# Patient Record
Sex: MALE | Race: BLACK OR AFRICAN AMERICAN | HISPANIC OR LATINO | Employment: FULL TIME | URBAN - METROPOLITAN AREA
[De-identification: names, ages, dates, MRNs, and addresses within clinical notes are randomized per-mention and may not be internally consistent; named-entity substitution may affect disease eponyms.]

---

## 2017-01-19 ENCOUNTER — TRANSCRIBE ORDERS (OUTPATIENT)
Dept: LAB | Facility: HOSPITAL | Age: 27
End: 2017-01-19

## 2017-01-19 ENCOUNTER — APPOINTMENT (OUTPATIENT)
Dept: LAB | Facility: HOSPITAL | Age: 27
End: 2017-01-19

## 2017-01-19 DIAGNOSIS — F32.1 MAJOR DEPRESSIVE DISORDER, SINGLE EPISODE, MODERATE (HCC): ICD-10-CM

## 2017-01-19 DIAGNOSIS — Z79.899 ENCOUNTER FOR LONG-TERM (CURRENT) USE OF OTHER MEDICATIONS: Primary | ICD-10-CM

## 2017-01-19 DIAGNOSIS — Z79.899 ENCOUNTER FOR LONG-TERM (CURRENT) USE OF OTHER MEDICATIONS: ICD-10-CM

## 2017-01-19 LAB
ALBUMIN SERPL BCP-MCNC: 4.3 G/DL (ref 3.5–5)
ALP SERPL-CCNC: 130 U/L (ref 46–116)
ALT SERPL W P-5'-P-CCNC: 53 U/L (ref 12–78)
ANION GAP SERPL CALCULATED.3IONS-SCNC: 7 MMOL/L (ref 4–13)
AST SERPL W P-5'-P-CCNC: 26 U/L (ref 5–45)
BASOPHILS # BLD AUTO: 0.02 THOUSANDS/ΜL (ref 0–0.1)
BASOPHILS NFR BLD AUTO: 0 % (ref 0–1)
BILIRUB SERPL-MCNC: 0.36 MG/DL (ref 0.2–1)
BUN SERPL-MCNC: 13 MG/DL (ref 5–25)
CALCIUM SERPL-MCNC: 9.1 MG/DL (ref 8.3–10.1)
CHLORIDE SERPL-SCNC: 103 MMOL/L (ref 100–108)
CHOLEST SERPL-MCNC: 178 MG/DL (ref 50–200)
CO2 SERPL-SCNC: 29 MMOL/L (ref 21–32)
CREAT SERPL-MCNC: 0.82 MG/DL (ref 0.6–1.3)
EOSINOPHIL # BLD AUTO: 0.21 THOUSAND/ΜL (ref 0–0.61)
EOSINOPHIL NFR BLD AUTO: 3 % (ref 0–6)
ERYTHROCYTE [DISTWIDTH] IN BLOOD BY AUTOMATED COUNT: 12.9 % (ref 11.6–15.1)
GFR SERPL CREATININE-BSD FRML MDRD: >60 ML/MIN/1.73SQ M
GLUCOSE SERPL-MCNC: 96 MG/DL (ref 65–140)
HCT VFR BLD AUTO: 49.5 % (ref 36.5–49.3)
HDLC SERPL-MCNC: 44 MG/DL (ref 40–60)
HGB BLD-MCNC: 17.1 G/DL (ref 12–17)
LDLC SERPL CALC-MCNC: 112 MG/DL (ref 0–100)
LYMPHOCYTES # BLD AUTO: 2.01 THOUSANDS/ΜL (ref 0.6–4.47)
LYMPHOCYTES NFR BLD AUTO: 29 % (ref 14–44)
MCH RBC QN AUTO: 30.5 PG (ref 26.8–34.3)
MCHC RBC AUTO-ENTMCNC: 34.5 G/DL (ref 31.4–37.4)
MCV RBC AUTO: 88 FL (ref 82–98)
MONOCYTES # BLD AUTO: 0.4 THOUSAND/ΜL (ref 0.17–1.22)
MONOCYTES NFR BLD AUTO: 6 % (ref 4–12)
NEUTROPHILS # BLD AUTO: 4.35 THOUSANDS/ΜL (ref 1.85–7.62)
NEUTS SEG NFR BLD AUTO: 62 % (ref 43–75)
NRBC BLD AUTO-RTO: 0 /100 WBCS
PLATELET # BLD AUTO: 220 THOUSANDS/UL (ref 149–390)
PMV BLD AUTO: 10.6 FL (ref 8.9–12.7)
POTASSIUM SERPL-SCNC: 4.3 MMOL/L (ref 3.5–5.3)
PROT SERPL-MCNC: 8.1 G/DL (ref 6.4–8.2)
RBC # BLD AUTO: 5.61 MILLION/UL (ref 3.88–5.62)
SODIUM SERPL-SCNC: 139 MMOL/L (ref 136–145)
TRIGL SERPL-MCNC: 108 MG/DL
TSH SERPL DL<=0.05 MIU/L-ACNC: 2.48 UIU/ML (ref 0.36–3.74)
WBC # BLD AUTO: 7.01 THOUSAND/UL (ref 4.31–10.16)

## 2017-01-19 PROCEDURE — 84443 ASSAY THYROID STIM HORMONE: CPT

## 2017-01-19 PROCEDURE — 36415 COLL VENOUS BLD VENIPUNCTURE: CPT

## 2017-01-19 PROCEDURE — 85025 COMPLETE CBC W/AUTO DIFF WBC: CPT

## 2017-01-19 PROCEDURE — 80061 LIPID PANEL: CPT

## 2017-01-19 PROCEDURE — 80053 COMPREHEN METABOLIC PANEL: CPT

## 2019-10-17 ENCOUNTER — APPOINTMENT (EMERGENCY)
Dept: RADIOLOGY | Facility: HOSPITAL | Age: 29
End: 2019-10-17
Payer: OTHER MISCELLANEOUS

## 2019-10-17 ENCOUNTER — HOSPITAL ENCOUNTER (EMERGENCY)
Facility: HOSPITAL | Age: 29
Discharge: HOME | End: 2019-10-17
Attending: EMERGENCY MEDICINE | Admitting: EMERGENCY MEDICINE
Payer: OTHER MISCELLANEOUS

## 2019-10-17 VITALS
HEART RATE: 72 BPM | RESPIRATION RATE: 18 BRPM | DIASTOLIC BLOOD PRESSURE: 81 MMHG | OXYGEN SATURATION: 98 % | TEMPERATURE: 98.1 F | SYSTOLIC BLOOD PRESSURE: 139 MMHG

## 2019-10-17 VITALS
BODY MASS INDEX: 28.34 KG/M2 | OXYGEN SATURATION: 97 % | TEMPERATURE: 98.3 F | HEART RATE: 73 BPM | WEIGHT: 160 LBS | DIASTOLIC BLOOD PRESSURE: 73 MMHG | RESPIRATION RATE: 18 BRPM | SYSTOLIC BLOOD PRESSURE: 134 MMHG

## 2019-10-17 DIAGNOSIS — S62.609A FINGER FRACTURE: Primary | ICD-10-CM

## 2019-10-17 DIAGNOSIS — M79.5 FOREIGN BODY (FB) IN SOFT TISSUE: Primary | ICD-10-CM

## 2019-10-17 PROCEDURE — 73140 X-RAY EXAM OF FINGER(S): CPT

## 2019-10-17 PROCEDURE — 99283 EMERGENCY DEPT VISIT LOW MDM: CPT | Performed by: PHYSICIAN ASSISTANT

## 2019-10-17 PROCEDURE — 10120 INC&RMVL FB SUBQ TISS SMPL: CPT | Performed by: PHYSICIAN ASSISTANT

## 2019-10-17 PROCEDURE — 29130 APPL FINGER SPLINT STATIC: CPT | Performed by: PHYSICIAN ASSISTANT

## 2019-10-17 PROCEDURE — 99283 EMERGENCY DEPT VISIT LOW MDM: CPT

## 2019-10-17 RX ORDER — AMOXICILLIN AND CLAVULANATE POTASSIUM 875; 125 MG/1; MG/1
1 TABLET, FILM COATED ORAL EVERY 12 HOURS
Qty: 14 TABLET | Refills: 0 | Status: SHIPPED | OUTPATIENT
Start: 2019-10-17 | End: 2019-10-25

## 2019-10-17 RX ORDER — LIDOCAINE HYDROCHLORIDE 10 MG/ML
5 INJECTION, SOLUTION EPIDURAL; INFILTRATION; INTRACAUDAL; PERINEURAL ONCE
Status: COMPLETED | OUTPATIENT
Start: 2019-10-17 | End: 2019-10-17

## 2019-10-17 RX ORDER — BUPIVACAINE HYDROCHLORIDE 2.5 MG/ML
5 INJECTION, SOLUTION EPIDURAL; INFILTRATION; INTRACAUDAL ONCE
Status: COMPLETED | OUTPATIENT
Start: 2019-10-17 | End: 2019-10-17

## 2019-10-17 RX ORDER — AMOXICILLIN AND CLAVULANATE POTASSIUM 875; 125 MG/1; MG/1
1 TABLET, FILM COATED ORAL ONCE
Status: COMPLETED | OUTPATIENT
Start: 2019-10-17 | End: 2019-10-17

## 2019-10-17 RX ADMIN — BUPIVACAINE HYDROCHLORIDE 5 ML: 2.5 INJECTION, SOLUTION EPIDURAL; INFILTRATION; INTRACAUDAL; PERINEURAL at 12:30

## 2019-10-17 RX ADMIN — AMOXICILLIN AND CLAVULANATE POTASSIUM 1 TABLET: 875; 125 TABLET, FILM COATED ORAL at 13:00

## 2019-10-17 RX ADMIN — LIDOCAINE HYDROCHLORIDE 5 ML: 10 INJECTION, SOLUTION EPIDURAL; INFILTRATION; INTRACAUDAL; PERINEURAL at 12:30

## 2019-10-17 NOTE — ED PROVIDER NOTES
History  Chief Complaint   Patient presents with    Finger Injury     pt here for re eval of finger injury from earlier today     Patient was called for notification an associated fracture with his nail foreign body  He presented to the emergency room when he was thrown to be notified regarding the fracture  History provided by:  Patient  Hand Pain   Location:  Left little finger  Severity:  Mild  Onset quality:  Sudden  Progression:  Unchanged      Prior to Admission Medications   Prescriptions Last Dose Informant Patient Reported? Taking?   amoxicillin-clavulanate (AUGMENTIN) 875-125 mg per tablet   No No   Sig: Take 1 tablet by mouth every 12 (twelve) hours for 7 days   ibuprofen (MOTRIN) 600 mg tablet   No No   Sig: Take 1 tablet (600 mg total) by mouth every 6 (six) hours as needed for mild pain or fever for up to 20 doses  Facility-Administered Medications: None       No past medical history on file  No past surgical history on file  No family history on file  I have reviewed and agree with the history as documented  Social History     Tobacco Use    Smoking status: Never Smoker    Smokeless tobacco: Never Used   Substance Use Topics    Alcohol use: No    Drug use: No        Review of Systems   Skin: Positive for wound  All other systems reviewed and are negative  Physical Exam  Physical Exam   Constitutional: He is oriented to person, place, and time  He appears well-developed and well-nourished  No distress  HENT:   Head: Normocephalic and atraumatic  Eyes: Conjunctivae are normal  Right eye exhibits no discharge  Left eye exhibits no discharge  Pulmonary/Chest: Effort normal    Neurological: He is alert and oriented to person, place, and time  Skin: Skin is warm  Capillary refill takes less than 2 seconds  He is not diaphoretic  Wound healing  Finger aligned  Psychiatric: He has a normal mood and affect   His behavior is normal  Judgment and thought content normal    Nursing note and vitals reviewed  Vital Signs  ED Triage Vitals [10/17/19 1653]   Temperature Pulse Respirations Blood Pressure SpO2   98 1 °F (36 7 °C) 72 18 139/81 98 %      Temp Source Heart Rate Source Patient Position - Orthostatic VS BP Location FiO2 (%)   Oral Monitor Lying Right arm --      Pain Score       --           Vitals:    10/17/19 1653   BP: 139/81   Pulse: 72   Patient Position - Orthostatic VS: Lying         Visual Acuity      ED Medications  Medications - No data to display    Diagnostic Studies  Results Reviewed     None                 XR finger second digit-index LEFT   ED Interpretation by Jenna Mathias PA-C (10/17 1705)   Nondisplaced fracture of the middle phalanx with retained foreign body      Final Result by Angie 6, DO (10/17 1713)      Majority of the 4th body has been removed although a linear residual metal foreign body is seen with adjacent tiny punctate metallic foreign bodies in the adjacent soft tissues  Middle phalangeal nondisplaced fracture noted              Workstation performed: [id number]                    Procedures  Splint application  Date/Time: 10/17/2019 5:03 PM  Performed by: Jenna Mathias PA-C  Authorized by: Jenna Mathias PA-C     Patient location:  Bedside  Procedure performed by emergency physician: Yes    Other Assisting Provider: Yes (comment)    Consent:     Consent obtained:  Verbal    Consent given by:  Patient    Risks discussed:  Discoloration, numbness, pain and swelling    Alternatives discussed:  No treatment  Universal protocol:     Procedure explained and questions answered to patient or proxy's satisfaction: yes      Site/side marked: yes      Immediately prior to procedure a time out was called: yes      Patient identity confirmed:  Verbally with patient  Indication:     Indications: fracture    Pre-procedure details:     Sensation:  Normal  Procedure details:     Laterality:  Left    Location: Index finger  Splint type:  Finger splint, static  Post-procedure details:     Neurovascular Exam: skin pink, capillary refill <2 sec, normal pulses and skin intact, warm, and dry      Patient tolerance of procedure: Tolerated well, no immediate complications           ED Course                               MDM  Number of Diagnoses or Management Options  Finger fracture: new and requires workup     Amount and/or Complexity of Data Reviewed  Tests in the radiology section of CPT®: ordered and reviewed  Tests in the medicine section of CPT®: ordered and reviewed    Risk of Complications, Morbidity, and/or Mortality  Presenting problems: moderate  Diagnostic procedures: moderate  Management options: moderate  General comments: Patient was called for notification of a fracture that was associated with the nail foreign body  He presented back to the emergency room for evaluation  He was seen and examined  I explained the x-ray results to him  I did repeat x-rays which demonstrated a nondisplaced fracture through the middle phalanx of the left index finger with a retained foreign body  There were barbs on the nail upon removal   One is still imbedded within the soft tissues  The patient was placed in a splint for comfort  He is going to continue with the Augmentin twice a day for the next 7 days  He will call Orthopedics and arrange for a follow-up appointment  We did discuss the foreign body  He will see the hand surgeon at orthopedics to see if anything needs to be further dad about the retained foreign body and fracture  He was up-to-date for tetanus      Patient Progress  Patient progress: stable      Disposition  Final diagnoses:   Finger fracture - with retained foreign body     Time reflects when diagnosis was documented in both MDM as applicable and the Disposition within this note     Time User Action Codes Description Comment    10/17/2019  5:05 PM Selena Eaton Add [O82 773Y] Finger fracture 10/17/2019  5:06 PM Lamond Lefort Modify [S62 609A] Finger fracture with retained foreign body      ED Disposition     ED Disposition Condition Date/Time Comment    Discharge Stable Thu Oct 17, 2019  5:05 PM Jossie Singh discharge to home/self care  Follow-up Information     Follow up With Specialties Details Why Contact Info    Tri Ferrell MD Orthopedic Surgery Schedule an appointment as soon as possible for a visit in 1 day for follow up [address] [place] [address]  [phone]            Discharge Medication List as of 10/17/2019  5:07 PM      CONTINUE these medications which have NOT CHANGED    Details   amoxicillin-clavulanate (AUGMENTIN) 875-125 mg per tablet Take 1 tablet by mouth every 12 (twelve) hours for 7 days, Starting Thu 10/17/2019, Until Thu 10/24/2019, Normal      ibuprofen (MOTRIN) 600 mg tablet Take 1 tablet (600 mg total) by mouth every 6 (six) hours as needed for mild pain or fever for up to 20 doses  , Starting 2/1/2016, Until Discontinued, Print           No discharge procedures on file      ED Provider  Electronically Signed by           Roman Ramos PA-C  10/17/19 [address] [facility] Daniel Monroy PA-C  10/17/19 8801

## 2019-10-17 NOTE — ED PROVIDER NOTES
History  Chief Complaint   Patient presents with    Finger Injury     Pt presents with a nail through his L index finger  Patient presents with a nail through his left index finger while working on his house at home  He states his last tetanus shot is within the past year  He presents with a nail through his left index finger  History provided by:  Patient  Hand Pain   Location:  Left index finger  Quality:  Ache  Severity:  Mild  Onset quality:  Sudden  Duration:  1 hour  Timing:  Constant  Progression:  Waxing and waning  Chronicity:  New  Context:  Nail gun injury  Worsened by:  Contact      Prior to Admission Medications   Prescriptions Last Dose Informant Patient Reported? Taking?   ibuprofen (MOTRIN) 600 mg tablet   No No   Sig: Take 1 tablet (600 mg total) by mouth every 6 (six) hours as needed for mild pain or fever for up to 20 doses  Facility-Administered Medications: None       History reviewed  No pertinent past medical history  History reviewed  No pertinent surgical history  History reviewed  No pertinent family history  I have reviewed and agree with the history as documented  Social History     Tobacco Use    Smoking status: Never Smoker    Smokeless tobacco: Never Used   Substance Use Topics    Alcohol use: No    Drug use: No        Review of Systems   Constitutional: Positive for activity change  Musculoskeletal: Positive for arthralgias  Skin: Positive for wound  Negative for color change  All other systems reviewed and are negative  Physical Exam  Physical Exam   Constitutional: He appears well-developed and well-nourished  No distress  Musculoskeletal:   Examination of the left hand  There is a nail through the middle phalanx of the left index finger  There is good range of motion in all planes  Remainder of the hand is nontender, atraumatic  Neurological: He is alert  Skin: Skin is warm  Capillary refill takes less than 2 seconds   He is not diaphoretic  Psychiatric: He has a normal mood and affect  His behavior is normal  Judgment and thought content normal    Nursing note and vitals reviewed  Vital Signs  ED Triage Vitals   Temperature Pulse Respirations Blood Pressure SpO2   10/17/19 1213 10/17/19 1211 10/17/19 1211 10/17/19 1211 10/17/19 1211   98 3 °F (36 8 °C) 73 18 134/73 97 %      Temp Source Heart Rate Source Patient Position - Orthostatic VS BP Location FiO2 (%)   10/17/19 1213 10/17/19 1211 10/17/19 1211 10/17/19 1211 --   Oral Monitor Sitting Right arm       Pain Score       10/17/19 1211       9           Vitals:    10/17/19 1211   BP: 134/73   Pulse: 73   Patient Position - Orthostatic VS: Sitting         Visual Acuity      ED Medications  Medications   lidocaine (PF) (XYLOCAINE-MPF) 1 % injection 5 mL (5 mL Infiltration Given by Other 10/17/19 1230)   bupivacaine (PF) (MARCAINE) 0 25 % injection 5 mL (5 mL Infiltration Given by Other 10/17/19 1230)   amoxicillin-clavulanate (AUGMENTIN) 875-125 mg per tablet 1 tablet (1 tablet Oral Given 10/17/19 1300)       Diagnostic Studies  Results Reviewed     None                 XR finger second digit-index LEFT   ED Interpretation by Carole Kemp PA-C (10/17 125)   Nail foreign body within the soft tissues  No fracture  Procedures  Foreign Body - Embedded  Date/Time: 10/17/2019 12:40 PM  Performed by: Carole Kemp PA-C  Authorized by: Carole Kemp PA-C     Patient location:  ED  Other Assisting Provider: No    Consent:     Consent obtained:  Verbal    Consent given by:  Patient    Risks discussed:  Bleeding, infection, pain, worsening of condition, incomplete removal, nerve damage and poor cosmetic result    Alternatives discussed:  No treatment  Universal protocol:     Procedure explained and questions answered to patient or proxy's satisfaction: yes      Radiology Images displayed and confirmed    If images not available, report reviewed : yes Site/side marked: yes      Immediately prior to procedure, a time out was called: yes      Patient identity confirmed:  Verbally with patient  Location:     Location: Left index finger  Depth:  Subcutaneous    Tendon involvement:  None  Pre-procedure details:     Imaging:  X-ray    Neurovascular status: intact      Preparation: Patient was prepped and draped in usual sterile fashion    Anesthesia (see MAR for exact dosages): Anesthesia method:  Nerve block    Block anesthetic:  Lidocaine 1% w/o epi and bupivacaine 0 25% w/o epi    Block technique:  Metacarpal    Block injection procedure:  Anatomic landmarks identified  Procedure details:     Scalpel size: Removed with pliers  Dissection of underlying tissues: no      Bloodless field: yes      Removal mechanism: Apply hours  Procedure complexity:  Simple    Foreign bodies recovered:  1 (Nail)    Description:  4 cm    Intact foreign body removal: yes    Post-procedure details:     Neurovascular status: intact      Confirmation:  No additional foreign bodies on visualization    Skin closure:  None    Dressing:  Non-adherent dressing    Patient tolerance of procedure: Tolerated well, no immediate complications  Comments:      Patient was instructed to soak his finger in warm water for 20 minutes 3 to 4 times a day for the past 2 days  He may apply bacitracin to the area as needed  He was placed on Augmentin twice daily for the next 7 days             ED Course                               MDM  Number of Diagnoses or Management Options  Foreign body (FB) in soft tissue: new and requires workup     Amount and/or Complexity of Data Reviewed  Tests in the radiology section of CPT®: ordered and reviewed  Tests in the medicine section of CPT®: ordered and reviewed    Risk of Complications, Morbidity, and/or Mortality  Presenting problems: moderate  Diagnostic procedures: moderate  Management options: moderate  General comments: Patient presents emergency room after accidentally nailing his left index finger with a nail gun  His immunizations are up-to-date  He presents with a foreign body in his left index finger  He was seen and evaluated  A metacarpal block was performed  The nail was removed with a pair of pliers without complications  Patient was placed on Augmentin twice daily for a week  He will soak the finger in warm water for 20 minutes 3 to 4 times a day  He will return to the emergency room if he has any signs of infection  Patient Progress  Patient progress: stable      Disposition  Final diagnoses:   Foreign body (FB) in soft tissue - Acute left index finger     Time reflects when diagnosis was documented in both MDM as applicable and the Disposition within this note     Time User Action Codes Description Comment    10/17/2019  1:11 PM Silvia Smallwood [M79 5] Foreign body (FB) in soft tissue     10/17/2019  1:12 PM Stefan Webster [M79 5] Foreign body (FB) in soft tissue Acute left index finger      ED Disposition     ED Disposition Condition Date/Time Comment    Discharge Stable Thu Oct 17, 2019  1:11 PM Anthony Meckel discharge to home/self care  Follow-up Information    None         Discharge Medication List as of 10/17/2019  1:13 PM      START taking these medications    Details   amoxicillin-clavulanate (AUGMENTIN) 875-125 mg per tablet Take 1 tablet by mouth every 12 (twelve) hours for 7 days, Starting Thu 10/17/2019, Until Thu 10/24/2019, Normal         CONTINUE these medications which have NOT CHANGED    Details   ibuprofen (MOTRIN) 600 mg tablet Take 1 tablet (600 mg total) by mouth every 6 (six) hours as needed for mild pain or fever for up to 20 doses  , Starting 2/1/2016, Until Discontinued, Print           No discharge procedures on file      ED Provider  Electronically Signed by           Juan Antonio Reeder PA-C  10/17/19 2612

## 2019-10-17 NOTE — RESULT ENCOUNTER NOTE
Patient called the phone number listed  No answer at this time  Left message on answering machine for call back to ER  2nd attempt to contact patient via emergency contact, his mother was with  Was able to take message regarding x-ray findings  Patient to return to the emergency department for additional imaging and splinting of his finger  Phone calls made with assistance of ARELI Posey tech

## 2019-10-23 ENCOUNTER — OFFICE VISIT (OUTPATIENT)
Dept: OBGYN CLINIC | Facility: CLINIC | Age: 29
End: 2019-10-23
Payer: OTHER MISCELLANEOUS

## 2019-10-23 ENCOUNTER — APPOINTMENT (OUTPATIENT)
Dept: RADIOLOGY | Facility: AMBULARY SURGERY CENTER | Age: 29
End: 2019-10-23
Attending: SURGERY
Payer: OTHER MISCELLANEOUS

## 2019-10-23 VITALS
WEIGHT: 160 LBS | SYSTOLIC BLOOD PRESSURE: 132 MMHG | HEART RATE: 59 BPM | HEIGHT: 63 IN | DIASTOLIC BLOOD PRESSURE: 83 MMHG | BODY MASS INDEX: 28.35 KG/M2

## 2019-10-23 DIAGNOSIS — S60.459A ACUTE FOREIGN BODY OF INDEX FINGER, INITIAL ENCOUNTER: ICD-10-CM

## 2019-10-23 DIAGNOSIS — S62.651A CLOSED NONDISPLACED FRACTURE OF MIDDLE PHALANX OF LEFT INDEX FINGER, INITIAL ENCOUNTER: Primary | ICD-10-CM

## 2019-10-23 DIAGNOSIS — M79.645 PAIN IN FINGER OF LEFT HAND: ICD-10-CM

## 2019-10-23 PROCEDURE — 99203 OFFICE O/P NEW LOW 30 MIN: CPT | Performed by: SURGERY

## 2019-10-23 PROCEDURE — 73140 X-RAY EXAM OF FINGER(S): CPT

## 2019-10-23 NOTE — H&P (VIEW-ONLY)
ASSESSMENT/PLAN:      29 y/o male who presents today for an initial visit for an injury to his left index finger  Will refer to OT to make hand based radial gutter splint  May take off to shower  He will also be scheduled for surgical procedure to remove foreign body  The translation line was used to obtain informed consent today in the office  He understood and all questions were answered  Patient was scheduled appropriately today for a left index finger excision of foreign body with washout  Instructed patient that at this job is physical he is to expect to remain out of work for 3-4 weeks  Note was provided today  The patient verbalized understanding of exam findings and treatment plan  We engaged in the shared decision-making process and treatment options were discussed at length with the patient  Surgical and conservative management discussed today along with risks and benefits  Diagnoses and all orders for this visit:    Closed nondisplaced fracture of middle phalanx of left index finger, initial encounter    Acute foreign body of index finger, initial encounter          Standard Consent: The risks and benefits of the procedure were explained to the patient, which include, but are not limited to: Bleeding, infection, recurrence, pain, scar, damage to tendons, damage to nerves, and damage to blood vessels, failure to give desired results and complications related to anesthesia  These risks, along with alternative conservative treatment options, and postoperative protocols were voiced back and understood by the patient  All questions were answered to the patient's satisfaction  The patient agrees to comply with a standard postoperative protocol, and is willing to proceed  Education was provided via written and auditory forms  There were no barriers to learning  Written handouts regarding wound care, incision and scar care, and general preoperative information was provided to the patient    Prior to surgery, the patient may be requested to stop all anti-inflammatory medications  Prophylactic aspirin, Plavix, and Coumadin may be allowed to be continued  Medications including vitamin E , ginkgo, and fish oil are requested to be stopped approximately one week prior to surgery  Hypertensive medications and beta blockers, if taken, should be continued  Follow Up:  Return for post operative visit  To Do Next Visit:  Re-evaluation of current issue, X-rays of the  left  index finger and Sutures out    ____________________________________________________________________________________________________________________________________________      CHIEF COMPLAINT:  Chief Complaint   Patient presents with    Left Index Finger - Pain       SUBJECTIVE:  Kaylyn Lacy is a 30y o  year old RHD male who presents today for an initial visit for his left hand index finger  Patient states that he was working on his house and had an injury involving a nail gun on 10/17/19  He was seen in the ED the same day where they took x rays, removed the nail from his index finger and referred him to orthopedics for further evaluation  Today, patient states that he continues to have pain/stiffness about the index finger, especially with motion  Patient states that he is up-to-date on his tetanus shot  Denies numbness and tingling, fevers or chills  I have personally reviewed all the relevant PMH, PSH, SH, FH, Medications and allergies  PAST MEDICAL HISTORY:  History reviewed  No pertinent past medical history  PAST SURGICAL HISTORY:  History reviewed  No pertinent surgical history  FAMILY HISTORY:  History reviewed  No pertinent family history      SOCIAL HISTORY:  Social History     Tobacco Use    Smoking status: Never Smoker    Smokeless tobacco: Never Used   Substance Use Topics    Alcohol use: No    Drug use: No       MEDICATIONS:    Current Outpatient Medications:     amoxicillin-clavulanate (AUGMENTIN) 875-125 mg per tablet, Take 1 tablet by mouth every 12 (twelve) hours for 7 days, Disp: 14 tablet, Rfl: 0    ibuprofen (MOTRIN) 600 mg tablet, Take 1 tablet (600 mg total) by mouth every 6 (six) hours as needed for mild pain or fever for up to 20 doses  , Disp: 30 tablet, Rfl: 0    ALLERGIES:  No Known Allergies    REVIEW OF SYSTEMS:  Review of Systems   Constitutional: Negative for chills, fatigue, fever and unexpected weight change  HENT: Negative for hearing loss, nosebleeds and sore throat  Eyes: Negative for pain, redness and visual disturbance  Respiratory: Negative for cough, shortness of breath and wheezing  Cardiovascular: Negative for chest pain, palpitations and leg swelling  Gastrointestinal: Negative for abdominal pain, nausea and vomiting  Endocrine: Negative for polydipsia and polyuria  Genitourinary: Negative for frequency and urgency  Skin: Negative for color change, rash and wound  Neurological: Negative for dizziness, weakness, numbness and headaches  Psychiatric/Behavioral: Negative for behavioral problems, self-injury and suicidal ideas         VITALS:  Vitals:    10/23/19 0808   BP: 132/83   Pulse: 59       LABS:  HgA1c: No results found for: HGBA1C  BMP:   Lab Results   Component Value Date    CALCIUM 9 1 01/19/2017    K 4 3 01/19/2017    CO2 29 01/19/2017     01/19/2017    BUN 13 01/19/2017    CREATININE 0 82 01/19/2017       _____________________________________________________  PHYSICAL EXAMINATION:  General: well developed and well nourished, alert, oriented times 3 and appears comfortable  Psychiatric: Normal  HEENT: Normocephalic, Atraumatic Trachea Midline, No torticollis  Pulmonary: No audible wheezing or respiratory distress   Cardiovascular: No pitting edema, 2+ radial pulse   Skin: No Masses, No Erythema, No Fluctuation, No Ulcerations  Neurovascular: Sensation Intact to the Median, Ulnar, Radial Nerve, Motor Intact to the Median, Ulnar, Radial Nerve and Pulses Intact  Musculoskeletal: Normal, except as noted in detailed exam and in HPI  MUSCULOSKELETAL EXAMINATION:    Left Index Finger: Skin is intact  No erythema or ecchymosis  Wound is healing, no signs of infection  Moderate soft tissue swelling  Significant tenderness about the middle and distal phalanx of the finger  Limited ROM due to pain  Unable to make full composite fist  Patient is neurovascular intact distally  2+ Radial pulse     ___________________________________________________  STUDIES REVIEWED:  I have personally reviewed AP lateral and oblique radiographs of left 2nd digit   which demonstrate nondisplaced fracture middle phalanx  Retained metallic foreign body identified within the middle phalanx of the 2nd finger                     PROCEDURES PERFORMED:  Procedures  No Procedures performed today    _____________________________________________________      Justin Rankin    I,:   Robert Esquivel am acting as a scribe while in the presence of the attending physician :        I,:   Aisha Toussaint MD personally performed the services described in this documentation    as scribed in my presence :

## 2019-10-23 NOTE — H&P
ASSESSMENT/PLAN:      29 y/o male who presents today for an initial visit for an injury to his left index finger  Will refer to OT to make hand based radial gutter splint  May take off to shower  He will also be scheduled for surgical procedure to remove foreign body  The translation line was used to obtain informed consent today in the office  He understood and all questions were answered  Patient was scheduled appropriately today for a left index finger excision of foreign body with washout  Instructed patient that at this job is physical he is to expect to remain out of work for 3-4 weeks  Note was provided today  The patient verbalized understanding of exam findings and treatment plan  We engaged in the shared decision-making process and treatment options were discussed at length with the patient  Surgical and conservative management discussed today along with risks and benefits  Diagnoses and all orders for this visit:    Closed nondisplaced fracture of middle phalanx of left index finger, initial encounter    Acute foreign body of index finger, initial encounter          Standard Consent: The risks and benefits of the procedure were explained to the patient, which include, but are not limited to: Bleeding, infection, recurrence, pain, scar, damage to tendons, damage to nerves, and damage to blood vessels, failure to give desired results and complications related to anesthesia  These risks, along with alternative conservative treatment options, and postoperative protocols were voiced back and understood by the patient  All questions were answered to the patient's satisfaction  The patient agrees to comply with a standard postoperative protocol, and is willing to proceed  Education was provided via written and auditory forms  There were no barriers to learning  Written handouts regarding wound care, incision and scar care, and general preoperative information was provided to the patient    Prior to surgery, the patient may be requested to stop all anti-inflammatory medications  Prophylactic aspirin, Plavix, and Coumadin may be allowed to be continued  Medications including vitamin E , ginkgo, and fish oil are requested to be stopped approximately one week prior to surgery  Hypertensive medications and beta blockers, if taken, should be continued  Follow Up:  Return for post operative visit  To Do Next Visit:  Re-evaluation of current issue, X-rays of the  left  index finger and Sutures out    ____________________________________________________________________________________________________________________________________________      CHIEF COMPLAINT:  Chief Complaint   Patient presents with    Left Index Finger - Pain       SUBJECTIVE:  Raul Swann is a 30y o  year old RHD male who presents today for an initial visit for his left hand index finger  Patient states that he was working on his house and had an injury involving a nail gun on 10/17/19  He was seen in the ED the same day where they took x rays, removed the nail from his index finger and referred him to orthopedics for further evaluation  Today, patient states that he continues to have pain/stiffness about the index finger, especially with motion  Patient states that he is up-to-date on his tetanus shot  Denies numbness and tingling, fevers or chills  I have personally reviewed all the relevant PMH, PSH, SH, FH, Medications and allergies  PAST MEDICAL HISTORY:  History reviewed  No pertinent past medical history  PAST SURGICAL HISTORY:  History reviewed  No pertinent surgical history  FAMILY HISTORY:  History reviewed  No pertinent family history      SOCIAL HISTORY:  Social History     Tobacco Use    Smoking status: Never Smoker    Smokeless tobacco: Never Used   Substance Use Topics    Alcohol use: No    Drug use: No       MEDICATIONS:    Current Outpatient Medications:     amoxicillin-clavulanate (AUGMENTIN) 875-125 mg per tablet, Take 1 tablet by mouth every 12 (twelve) hours for 7 days, Disp: 14 tablet, Rfl: 0    ibuprofen (MOTRIN) 600 mg tablet, Take 1 tablet (600 mg total) by mouth every 6 (six) hours as needed for mild pain or fever for up to 20 doses  , Disp: 30 tablet, Rfl: 0    ALLERGIES:  No Known Allergies    REVIEW OF SYSTEMS:  Review of Systems   Constitutional: Negative for chills, fatigue, fever and unexpected weight change  HENT: Negative for hearing loss, nosebleeds and sore throat  Eyes: Negative for pain, redness and visual disturbance  Respiratory: Negative for cough, shortness of breath and wheezing  Cardiovascular: Negative for chest pain, palpitations and leg swelling  Gastrointestinal: Negative for abdominal pain, nausea and vomiting  Endocrine: Negative for polydipsia and polyuria  Genitourinary: Negative for frequency and urgency  Skin: Negative for color change, rash and wound  Neurological: Negative for dizziness, weakness, numbness and headaches  Psychiatric/Behavioral: Negative for behavioral problems, self-injury and suicidal ideas         VITALS:  Vitals:    10/23/19 0808   BP: 132/83   Pulse: 59       LABS:  HgA1c: No results found for: HGBA1C  BMP:   Lab Results   Component Value Date    CALCIUM 9 1 01/19/2017    K 4 3 01/19/2017    CO2 29 01/19/2017     01/19/2017    BUN 13 01/19/2017    CREATININE 0 82 01/19/2017       _____________________________________________________  PHYSICAL EXAMINATION:  General: well developed and well nourished, alert, oriented times 3 and appears comfortable  Psychiatric: Normal  HEENT: Normocephalic, Atraumatic Trachea Midline, No torticollis  Pulmonary: No audible wheezing or respiratory distress   Cardiovascular: No pitting edema, 2+ radial pulse   Skin: No Masses, No Erythema, No Fluctuation, No Ulcerations  Neurovascular: Sensation Intact to the Median, Ulnar, Radial Nerve, Motor Intact to the Median, Ulnar, Radial Nerve and Pulses Intact  Musculoskeletal: Normal, except as noted in detailed exam and in HPI  MUSCULOSKELETAL EXAMINATION:    Left Index Finger: Skin is intact  No erythema or ecchymosis  Wound is healing, no signs of infection  Moderate soft tissue swelling  Significant tenderness about the middle and distal phalanx of the finger  Limited ROM due to pain  Unable to make full composite fist  Patient is neurovascular intact distally  2+ Radial pulse     ___________________________________________________  STUDIES REVIEWED:  I have personally reviewed AP lateral and oblique radiographs of left 2nd digit   which demonstrate nondisplaced fracture middle phalanx  Retained metallic foreign body identified within the middle phalanx of the 2nd finger                     PROCEDURES PERFORMED:  Procedures  No Procedures performed today    _____________________________________________________      King Raffaele    I,:   Dale Cody am acting as a scribe while in the presence of the attending physician :        I,:   Osorio Jacob MD personally performed the services described in this documentation    as scribed in my presence :

## 2019-10-23 NOTE — LETTER
October 23, 2019     Patient: Jossie Singh   YOB: 1990   Date of Visit: 10/23/2019       To Whom it May Concern:    Jossie Singh is under my professional care  He was seen in my office on 10/23/2019  He is to remain out of work until next follow-up evaluation  If you have any questions or concerns, please don't hesitate to call           Sincerely,          Leonel Rm MD        CC: No Recipients

## 2019-10-23 NOTE — PROGRESS NOTES
ASSESSMENT/PLAN:      31 y/o male who presents today for an initial visit for an injury to his left index finger  Will refer to OT to make hand based radial gutter splint  May take off to shower  He will also be scheduled for surgical procedure to remove foreign body  The translation line was used to obtain informed consent today in the office  He understood and all questions were answered  Patient was scheduled appropriately today for a left index finger excision of foreign body with washout  Instructed patient that at this job is physical he is to expect to remain out of work for 3-4 weeks  Note was provided today  The patient verbalized understanding of exam findings and treatment plan  We engaged in the shared decision-making process and treatment options were discussed at length with the patient  Surgical and conservative management discussed today along with risks and benefits  Diagnoses and all orders for this visit:    Closed nondisplaced fracture of middle phalanx of left index finger, initial encounter    Acute foreign body of index finger, initial encounter          Standard Consent: The risks and benefits of the procedure were explained to the patient, which include, but are not limited to: Bleeding, infection, recurrence, pain, scar, damage to tendons, damage to nerves, and damage to blood vessels, failure to give desired results and complications related to anesthesia  These risks, along with alternative conservative treatment options, and postoperative protocols were voiced back and understood by the patient  All questions were answered to the patient's satisfaction  The patient agrees to comply with a standard postoperative protocol, and is willing to proceed  Education was provided via written and auditory forms  There were no barriers to learning  Written handouts regarding wound care, incision and scar care, and general preoperative information was provided to the patient    Prior to surgery, the patient may be requested to stop all anti-inflammatory medications  Prophylactic aspirin, Plavix, and Coumadin may be allowed to be continued  Medications including vitamin E , ginkgo, and fish oil are requested to be stopped approximately one week prior to surgery  Hypertensive medications and beta blockers, if taken, should be continued  Follow Up:  Return for post operative visit  To Do Next Visit:  Re-evaluation of current issue, X-rays of the  left  index finger and Sutures out    ____________________________________________________________________________________________________________________________________________      CHIEF COMPLAINT:  Chief Complaint   Patient presents with    Left Index Finger - Pain       SUBJECTIVE:  Alfredo Yang is a 30y o  year old RHD male who presents today for an initial visit for his left hand index finger  Patient states that he was working on his house and had an injury involving a nail gun on 10/17/19  He was seen in the ED the same day where they took x rays, removed the nail from his index finger and referred him to orthopedics for further evaluation  Today, patient states that he continues to have pain/stiffness about the index finger, especially with motion  Patient states that he is up-to-date on his tetanus shot  Denies numbness and tingling, fevers or chills  I have personally reviewed all the relevant PMH, PSH, SH, FH, Medications and allergies  PAST MEDICAL HISTORY:  History reviewed  No pertinent past medical history  PAST SURGICAL HISTORY:  History reviewed  No pertinent surgical history  FAMILY HISTORY:  History reviewed  No pertinent family history      SOCIAL HISTORY:  Social History     Tobacco Use    Smoking status: Never Smoker    Smokeless tobacco: Never Used   Substance Use Topics    Alcohol use: No    Drug use: No       MEDICATIONS:    Current Outpatient Medications:     amoxicillin-clavulanate (AUGMENTIN) 875-125 mg per tablet, Take 1 tablet by mouth every 12 (twelve) hours for 7 days, Disp: 14 tablet, Rfl: 0    ibuprofen (MOTRIN) 600 mg tablet, Take 1 tablet (600 mg total) by mouth every 6 (six) hours as needed for mild pain or fever for up to 20 doses  , Disp: 30 tablet, Rfl: 0    ALLERGIES:  No Known Allergies    REVIEW OF SYSTEMS:  Review of Systems   Constitutional: Negative for chills, fatigue, fever and unexpected weight change  HENT: Negative for hearing loss, nosebleeds and sore throat  Eyes: Negative for pain, redness and visual disturbance  Respiratory: Negative for cough, shortness of breath and wheezing  Cardiovascular: Negative for chest pain, palpitations and leg swelling  Gastrointestinal: Negative for abdominal pain, nausea and vomiting  Endocrine: Negative for polydipsia and polyuria  Genitourinary: Negative for frequency and urgency  Skin: Negative for color change, rash and wound  Neurological: Negative for dizziness, weakness, numbness and headaches  Psychiatric/Behavioral: Negative for behavioral problems, self-injury and suicidal ideas         VITALS:  Vitals:    10/23/19 0808   BP: 132/83   Pulse: 59       LABS:  HgA1c: No results found for: HGBA1C  BMP:   Lab Results   Component Value Date    CALCIUM 9 1 01/19/2017    K 4 3 01/19/2017    CO2 29 01/19/2017     01/19/2017    BUN 13 01/19/2017    CREATININE 0 82 01/19/2017       _____________________________________________________  PHYSICAL EXAMINATION:  General: well developed and well nourished, alert, oriented times 3 and appears comfortable  Psychiatric: Normal  HEENT: Normocephalic, Atraumatic Trachea Midline, No torticollis  Pulmonary: No audible wheezing or respiratory distress   Cardiovascular: No pitting edema, 2+ radial pulse   Skin: No Masses, No Erythema, No Fluctuation, No Ulcerations  Neurovascular: Sensation Intact to the Median, Ulnar, Radial Nerve, Motor Intact to the Median, Ulnar, Radial Nerve and Pulses Intact  Musculoskeletal: Normal, except as noted in detailed exam and in HPI  MUSCULOSKELETAL EXAMINATION:    Left Index Finger: Skin is intact  No erythema or ecchymosis  Wound is healing, no signs of infection  Moderate soft tissue swelling  Significant tenderness about the middle and distal phalanx of the finger  Limited ROM due to pain  Unable to make full composite fist  Patient is neurovascular intact distally  2+ Radial pulse     ___________________________________________________  STUDIES REVIEWED:  I have personally reviewed AP lateral and oblique radiographs of left 2nd digit   which demonstrate nondisplaced fracture middle phalanx  Retained metallic foreign body identified within the middle phalanx of the 2nd finger                     PROCEDURES PERFORMED:  Procedures  No Procedures performed today    _____________________________________________________      Noe Kim    I,:   [place] am acting as a scribe while in the presence of the attending physician :        I,:   Leonel Rm MD personally performed the services described in this documentation    as scribed in my presence :

## 2019-10-24 ENCOUNTER — ANESTHESIA EVENT (OUTPATIENT)
Dept: PERIOP | Facility: AMBULARY SURGERY CENTER | Age: 29
End: 2019-10-24
Payer: OTHER MISCELLANEOUS

## 2019-10-25 NOTE — PRE-PROCEDURE INSTRUCTIONS
Pre-Surgery Instructions:   Medication Instructions    amoxicillin-clavulanate (AUGMENTIN) 875-125 mg per tablet Instructed patient per Anesthesia Guidelines   ibuprofen (MOTRIN) 600 mg tablet Instructed patient per Anesthesia Guidelines      Pre op,medications and showering instructions reviewed with Bella Nguyen -Patient has marshall

## 2019-10-29 ENCOUNTER — APPOINTMENT (OUTPATIENT)
Dept: RADIOLOGY | Facility: AMBULARY SURGERY CENTER | Age: 29
End: 2019-10-29
Payer: OTHER MISCELLANEOUS

## 2019-10-29 ENCOUNTER — ANESTHESIA (OUTPATIENT)
Dept: PERIOP | Facility: AMBULARY SURGERY CENTER | Age: 29
End: 2019-10-29
Payer: OTHER MISCELLANEOUS

## 2019-10-29 ENCOUNTER — HOSPITAL ENCOUNTER (OUTPATIENT)
Facility: AMBULARY SURGERY CENTER | Age: 29
Setting detail: OUTPATIENT SURGERY
Discharge: HOME | End: 2019-10-29
Attending: SURGERY | Admitting: SURGERY
Payer: OTHER MISCELLANEOUS

## 2019-10-29 VITALS
SYSTOLIC BLOOD PRESSURE: 120 MMHG | RESPIRATION RATE: 16 BRPM | HEART RATE: 64 BPM | TEMPERATURE: 97.2 F | DIASTOLIC BLOOD PRESSURE: 76 MMHG | BODY MASS INDEX: 28.35 KG/M2 | OXYGEN SATURATION: 96 % | HEIGHT: 63 IN | WEIGHT: 160 LBS

## 2019-10-29 DIAGNOSIS — S62.651D CLOSED NONDISPLACED FRACTURE OF MIDDLE PHALANX OF LEFT INDEX FINGER WITH ROUTINE HEALING, SUBSEQUENT ENCOUNTER: ICD-10-CM

## 2019-10-29 DIAGNOSIS — S62.651A CLOSED NONDISPLACED FRACTURE OF MIDDLE PHALANX OF LEFT INDEX FINGER, INITIAL ENCOUNTER: ICD-10-CM

## 2019-10-29 DIAGNOSIS — Z47.89 AFTERCARE FOLLOWING SURGERY OF THE MUSCULOSKELETAL SYSTEM: ICD-10-CM

## 2019-10-29 DIAGNOSIS — S60.459D: Primary | ICD-10-CM

## 2019-10-29 DIAGNOSIS — S60.459A ACUTE FOREIGN BODY OF INDEX FINGER, INITIAL ENCOUNTER: ICD-10-CM

## 2019-10-29 PROCEDURE — 73120 X-RAY EXAM OF HAND: CPT

## 2019-10-29 PROCEDURE — 88300 SURGICAL PATH GROSS: CPT | Performed by: PATHOLOGY

## 2019-10-29 PROCEDURE — 10120 INC&RMVL FB SUBQ TISS SMPL: CPT | Performed by: SURGERY

## 2019-10-29 PROCEDURE — 10120 INC&RMVL FB SUBQ TISS SMPL: CPT | Performed by: PHYSICIAN ASSISTANT

## 2019-10-29 RX ORDER — ONDANSETRON 2 MG/ML
INJECTION INTRAMUSCULAR; INTRAVENOUS AS NEEDED
Status: DISCONTINUED | OUTPATIENT
Start: 2019-10-29 | End: 2019-10-29 | Stop reason: SURG

## 2019-10-29 RX ORDER — FENTANYL CITRATE/PF 50 MCG/ML
25 SYRINGE (ML) INJECTION
Status: DISCONTINUED | OUTPATIENT
Start: 2019-10-29 | End: 2019-10-29 | Stop reason: HOSPADM

## 2019-10-29 RX ORDER — SODIUM CHLORIDE, SODIUM LACTATE, POTASSIUM CHLORIDE, CALCIUM CHLORIDE 600; 310; 30; 20 MG/100ML; MG/100ML; MG/100ML; MG/100ML
125 INJECTION, SOLUTION INTRAVENOUS CONTINUOUS
Status: DISCONTINUED | OUTPATIENT
Start: 2019-10-29 | End: 2019-10-29 | Stop reason: HOSPADM

## 2019-10-29 RX ORDER — ACETAMINOPHEN 325 MG/1
650 TABLET ORAL EVERY 6 HOURS PRN
Status: DISCONTINUED | OUTPATIENT
Start: 2019-10-29 | End: 2019-10-29 | Stop reason: HOSPADM

## 2019-10-29 RX ORDER — SODIUM CHLORIDE, SODIUM LACTATE, POTASSIUM CHLORIDE, CALCIUM CHLORIDE 600; 310; 30; 20 MG/100ML; MG/100ML; MG/100ML; MG/100ML
20 INJECTION, SOLUTION INTRAVENOUS CONTINUOUS
Status: DISCONTINUED | OUTPATIENT
Start: 2019-10-29 | End: 2019-10-29 | Stop reason: HOSPADM

## 2019-10-29 RX ORDER — HYDROCODONE BITARTRATE AND ACETAMINOPHEN 5; 325 MG/1; MG/1
1 TABLET ORAL EVERY 6 HOURS PRN
Qty: 7 TABLET | Refills: 0 | Status: SHIPPED | OUTPATIENT
Start: 2019-10-29 | End: 2019-11-08

## 2019-10-29 RX ORDER — FENTANYL CITRATE 50 UG/ML
INJECTION, SOLUTION INTRAMUSCULAR; INTRAVENOUS AS NEEDED
Status: DISCONTINUED | OUTPATIENT
Start: 2019-10-29 | End: 2019-10-29 | Stop reason: SURG

## 2019-10-29 RX ORDER — PROPOFOL 10 MG/ML
INJECTION, EMULSION INTRAVENOUS CONTINUOUS PRN
Status: DISCONTINUED | OUTPATIENT
Start: 2019-10-29 | End: 2019-10-29 | Stop reason: SURG

## 2019-10-29 RX ORDER — ONDANSETRON 2 MG/ML
4 INJECTION INTRAMUSCULAR; INTRAVENOUS ONCE AS NEEDED
Status: DISCONTINUED | OUTPATIENT
Start: 2019-10-29 | End: 2019-10-29 | Stop reason: HOSPADM

## 2019-10-29 RX ORDER — CEFAZOLIN SODIUM 2 G/50ML
2000 SOLUTION INTRAVENOUS ONCE
Status: COMPLETED | OUTPATIENT
Start: 2019-10-29 | End: 2019-10-29

## 2019-10-29 RX ORDER — PROPOFOL 10 MG/ML
INJECTION, EMULSION INTRAVENOUS AS NEEDED
Status: DISCONTINUED | OUTPATIENT
Start: 2019-10-29 | End: 2019-10-29 | Stop reason: SURG

## 2019-10-29 RX ORDER — KETOROLAC TROMETHAMINE 30 MG/ML
INJECTION, SOLUTION INTRAMUSCULAR; INTRAVENOUS AS NEEDED
Status: DISCONTINUED | OUTPATIENT
Start: 2019-10-29 | End: 2019-10-29 | Stop reason: SURG

## 2019-10-29 RX ORDER — MIDAZOLAM HYDROCHLORIDE 1 MG/ML
INJECTION INTRAMUSCULAR; INTRAVENOUS AS NEEDED
Status: DISCONTINUED | OUTPATIENT
Start: 2019-10-29 | End: 2019-10-29 | Stop reason: SURG

## 2019-10-29 RX ADMIN — MIDAZOLAM HYDROCHLORIDE 2 MG: 1 INJECTION, SOLUTION INTRAMUSCULAR; INTRAVENOUS at 11:04

## 2019-10-29 RX ADMIN — SODIUM CHLORIDE, SODIUM LACTATE, POTASSIUM CHLORIDE, AND CALCIUM CHLORIDE: .6; .31; .03; .02 INJECTION, SOLUTION INTRAVENOUS at 10:36

## 2019-10-29 RX ADMIN — ONDANSETRON 4 MG: 2 INJECTION INTRAMUSCULAR; INTRAVENOUS at 11:23

## 2019-10-29 RX ADMIN — KETOROLAC TROMETHAMINE 30 MG: 30 INJECTION, SOLUTION INTRAMUSCULAR at 11:29

## 2019-10-29 RX ADMIN — PROPOFOL 100 MCG/KG/MIN: 10 INJECTION, EMULSION INTRAVENOUS at 11:13

## 2019-10-29 RX ADMIN — PROPOFOL 50 MG: 10 INJECTION, EMULSION INTRAVENOUS at 11:13

## 2019-10-29 RX ADMIN — FENTANYL CITRATE 50 MCG: 50 INJECTION, SOLUTION INTRAMUSCULAR; INTRAVENOUS at 11:11

## 2019-10-29 RX ADMIN — CEFAZOLIN SODIUM 2000 MG: 2 SOLUTION INTRAVENOUS at 11:09

## 2019-10-29 NOTE — ANESTHESIA PREPROCEDURE EVALUATION
Review of Systems/Medical History  Patient summary reviewed        Cardiovascular  Negative cardio ROS    Pulmonary  Negative pulmonary ROS        GI/Hepatic  Negative GI/hepatic ROS          Negative  ROS        Endo/Other  Negative endo/other ROS      GYN  Negative gynecology ROS          Hematology  Negative hematology ROS      Musculoskeletal    Comment: Left index finger fracture      Neurology  Negative neurology ROS      Psychology   Negative psychology ROS              Physical Exam    Airway    Mallampati score: III  TM Distance: >3 FB  Neck ROM: full     Dental   No notable dental hx     Cardiovascular  Comment: Negative ROS,     Pulmonary      Other Findings        Anesthesia Plan  ASA Score- 1     Anesthesia Type- IV sedation with anesthesia with ASA Monitors  Additional Monitors:   Airway Plan:     Comment: Via   Plan Factors-    Induction- intravenous  Postoperative Plan- Plan for postoperative opioid use  Informed Consent- Anesthetic plan and risks discussed with patient  I personally reviewed this patient with the CRNA  Discussed and agreed on the Anesthesia Plan with the CRNA  Rubén Bonilla

## 2019-10-29 NOTE — DISCHARGE INSTRUCTIONS
Post Operative Instructions    You have had surgery on your arm today, please read and follow the information below:  · Elevate your hand above your elbow during the next 24-48 hours to help with swelling  · Place your hand and arm over your head with motion at your shoulder three times a day  · Do not apply any cream/ointment/oil to your incisions including antibiotics  · Do not soak your hands in standing water (dishwater, tubs, Jacuzzi's, pools, etc ) until given permission (typically 2-3 weeks after injury)    Call the office if you notice any:  · Increased numbness or tingling of your hand or fingers that is not relieved with elevation  · Increasing pain that is not controlled with medication  · Difficulty chewing, breathing, swallowing  · Chest pains or shortness of breath  · Fever over 101 4 degrees  Bandage: Do NOT remove bandage until follow-up appointment  Please do not put any topical agents on the surgical wound including neosporin, peroxide, tea tree oil, vitamin E, etc  as these can delay wound healing  Motion: Move fingers into a fist 5 times a day, DO NOT move any splinted fingers  Weight bearing status: Avoid heavy lifting (>5 pounds) with the extremity that was operated on until follow up appointment  Normal activities of daily living are OK  Ice: Ice for 10 minutes every hour as needed for swelling x 24 hours  Sling: No sling necessary  Pain medication:   Naproxen 220 mg two time a day (do not take this medication if you were told by your doctor that you cannot take anti-inflammatories or NSAIDS)  Tylenol Extended Release 650 mg every 8 hours  Norco/Hydrocodone one tab every 6 hours ONLY AS NEEDED for severe pain         Follow-up Appointment: 10-14 days with Dr Shubham Castano        Please call the office if you have any questions or concerns regarding your post-operative care

## 2019-10-29 NOTE — ANESTHESIA POSTPROCEDURE EVALUATION
Post-Op Assessment Note    CV Status:  Stable  Pain Score: 0    Pain management: adequate     Mental Status:  Alert and awake   Hydration Status:  Euvolemic   PONV Controlled:  Controlled   Airway Patency:  Patent   Post Op Vitals Reviewed: Yes      Staff: CRNA         109/60  BP   109/60   Temp     Pulse  62   Resp   22   SpO2   98 on 2LNC

## 2019-10-29 NOTE — OP NOTE
OPERATIVE REPORT  PATIENT NAME: Daniel Paige  :  1990  MRN: [id number]  Pt Location: [facility]    SURGERY DATE: 10/29/19    Surgeon(s) and Role:     * Ryder Dillon MD - Primary     * Christopher Lazo PA-C - Assisting    Pre-Op Diagnosis:  Closed nondisplaced fracture of middle phalanx of left index finger, initial encounter [S62 651A]  Acute foreign body of index finger, initial encounter [S60 399A]    Post-Op Diagnosis Codes:     * Closed nondisplaced fracture of middle phalanx of left index finger, initial encounter [S62 391A]     * Acute foreign body of index finger, initial encounter [S63 497A]    Procedure(s):  INDEX FINGER REMOVAL OF FOREIGN BODY (Left)  INDEX FINGER DEBRIDEMENT ([address]) (Left)    Specimen(s):  Order Name Source Comment Collection Info Order Time   TISSUE EXAM Foreign body  Collected By: Ryder Dillon MD 10/29/2019 11:26 AM       Estimated Blood Loss:   Minimal    Anesthesia Type:   Conscious Sedation     IMPLANTS:  * No implants in log *    PERIOPERATIVE ANTIBIOTICS:    cefazolin, 2 grams    Tourniquet Time: 16 min  at 250 mmHg          Operative Indications: The patient has a history of left index finger foreign body and incomplete fracture of the middle phalanx that was recalcitrant to conservative management  The decision was made to bring the patient to the operating room for left index finger removal of foreign body, irrigation debridement  Risks of the procedure were explained which include, but are not limited to bleeding; infection; damage to nerves, arteries,veins, tendons; scar; pain; need for reoperation; failure to give desired result; and risks of anaesthesia  All questions were answered to satisfaction and they were willing to proceed           Operative Findings:  Persistent small metal fragments after removal of 1 large fragment    Complications:   None    Procedure and Technique:  After the patient, site, and procedure were identified, the patient was brought into the operating room in a supine position  Local anaesthesia and sedation were provided  A well padded tourniquet was applied to the extremity, set at 250 mmHg  The  left upper extremity was then prepped and drapped in a normal, sterile, orthopedic fashion  Under fluoroscopic guidance the foreign body was localized overlying the middle phalanx of the index finger  Orthogonal fluoroscopic views were obtained and incision was marked out  A mid axial incision was made overlying the middle phalanx on the radial aspect of the finger and extending slightly distally past the DIP crease  Under loupe magnification full-thickness slight skin flaps were raised and the radial neurovascular bundle identified and protected  Again using fluoroscopic guidance to localize the mass the mass was identified and removed using a hemostat  Orthogonal fluoroscopic images were obtained which demonstrate removal of the 1 large by but persistence of very small metal dust particles  The wound was copiously irrigated and repeat at fluoroscopic images demonstrate no change in position of small debris  Any suspect tissue was then removed with a hemostat  No excisionally debridement was performed  At the completion of the procedure, hemostasis was obtained with cautery and direct pressure  The wounds were copiously irrigated with sterile solution  The wounds were closed with Prolene  Sterile dressings were applied, including Xeroform, gauze, tweeners, webril, ACE and radial gutter splint  Please note, all sponge, needle, and instrument counts were correct prior to closure  Loupe magnification was utilized  The patient tolerated the procedure well       I was present for the entire procedure, A qualified resident physician was not available and A physician assistant was required during the procedure for retraction tissue handling,dissection and suturing    Patient Disposition:  PACU     SIGNATURE: Aisha Toussaint MD  DATE: 10/29/19  TIME: 11:37 AM

## 2019-10-29 NOTE — INTERVAL H&P NOTE
H&P reviewed  After examining the patient I find no changes in the patients condition since the H&P had been written      Vitals:    10/29/19 0934   BP: 158/91   Pulse: 72   Resp: 18   Temp: (!) 96 9 °F (36 1 °C)   SpO2: 99%

## 2019-11-01 ENCOUNTER — TELEPHONE (OUTPATIENT)
Dept: OBGYN CLINIC | Facility: HOSPITAL | Age: 29
End: 2019-11-01

## 2019-11-01 NOTE — TELEPHONE ENCOUNTER
Giacomo Thomas    Patients wife states work note needs actual signature and be refaxed to [phone]  Please notify patient when sent   Thanks

## 2019-11-14 ENCOUNTER — OFFICE VISIT (OUTPATIENT)
Dept: OBGYN CLINIC | Facility: CLINIC | Age: 29
End: 2019-11-14

## 2019-11-14 ENCOUNTER — OFFICE VISIT (OUTPATIENT)
Dept: OCCUPATIONAL THERAPY | Facility: CLINIC | Age: 29
End: 2019-11-14
Payer: OTHER MISCELLANEOUS

## 2019-11-14 ENCOUNTER — APPOINTMENT (OUTPATIENT)
Dept: RADIOLOGY | Facility: AMBULARY SURGERY CENTER | Age: 29
End: 2019-11-14
Payer: OTHER MISCELLANEOUS

## 2019-11-14 VITALS
BODY MASS INDEX: 28.34 KG/M2 | HEART RATE: 71 BPM | DIASTOLIC BLOOD PRESSURE: 73 MMHG | SYSTOLIC BLOOD PRESSURE: 121 MMHG | HEIGHT: 63 IN

## 2019-11-14 DIAGNOSIS — S62.651D CLOSED NONDISPLACED FRACTURE OF MIDDLE PHALANX OF LEFT INDEX FINGER WITH ROUTINE HEALING, SUBSEQUENT ENCOUNTER: Primary | ICD-10-CM

## 2019-11-14 DIAGNOSIS — S62.651A CLOSED NONDISPLACED FRACTURE OF MIDDLE PHALANX OF LEFT INDEX FINGER, INITIAL ENCOUNTER: ICD-10-CM

## 2019-11-14 DIAGNOSIS — S62.651A CLOSED NONDISPLACED FRACTURE OF MIDDLE PHALANX OF LEFT INDEX FINGER, INITIAL ENCOUNTER: Primary | ICD-10-CM

## 2019-11-14 PROCEDURE — 73140 X-RAY EXAM OF FINGER(S): CPT

## 2019-11-14 PROCEDURE — 99024 POSTOP FOLLOW-UP VISIT: CPT | Performed by: PHYSICIAN ASSISTANT

## 2019-11-14 PROCEDURE — 97760 ORTHOTIC MGMT&TRAING 1ST ENC: CPT | Performed by: OCCUPATIONAL THERAPIST

## 2019-11-14 NOTE — PROGRESS NOTES
Orthosis    Diagnosis:   1  Closed nondisplaced fracture of middle phalanx of left index finger with routine healing, subsequent encounter       Indication: Fracture    Location: Left  index finger  Supplies: Custom Fit Orthotic  Orthosis type: Radial Gutter  Wearing Schedule: Remove for HEP  Describe Position: hand based, MPs flexed, Ips extended    Precautions: Fracture    Patient or Caregiver expresses understanding of wearing Schedule and Precautions? Yes  Patient or Caregiver able to don/doff orthotic independently? Yes    Written orders provided to patient?  Yes  Orders Obtained: Written  Orders Obtained from: Arturo Zhu    Return for evaluation and treatment Yes

## 2019-11-14 NOTE — LETTER
November 14, 2019     Patient: Marianne Raines   YOB: 1990   Date of Visit: 11/14/2019       To Whom it May Concern:    Marianne Raines is under my professional care  He was seen in my office on 11/14/2019  He is unable to use his left hand for the next 2 weeks  This includes lifting, pushing, pulling, forceful , etc   His restrictions will be reviewed at his next office visit in 2 weeks  We anticipate full return to work at that time  If you have any questions or concerns, please don't hesitate to call           Sincerely,          Tawanda Maddox PA-C        CC: Marianne Raines

## 2019-11-14 NOTE — PROGRESS NOTES
Assessment/Plan:  Patient ID: Nemo Tirado 30 y o  male   Surgery: Index Finger Removal Of Foreign Body - Left and Index Finger Debridement (wash Out) - Left  Date of Surgery: 10/29/2019     Sutures removed today, wound appears to be healing well with no evidence of infection  Patient is stiff with range of motion of the index finger, we will send him to OT for finger range of motion  X-rays taken today show overall healing of the bone but still has some lucency where the nail was removed, he is only 4 weeks out from his injury so we would like to protect this fracture for another 2 weeks  Script was placed for a hand based radial gutter splint to be made in OT to be worn over the next 1-2 weeks with the exception of range-of-motion exercises and hygiene  No work until the fracture is completely healed, likely another 2 weeks  Note was provided today  Will see him back in 2 weeks for range-of-motion check and likely release him back to work at this time    Follow Up:  2 weeks    To Do Next Visit:  ROM check, possible xrays of finger      CHIEF COMPLAINT:  Chief Complaint   Patient presents with    Left Index Finger - Post-op         SUBJECTIVE:  eNmo Tirado is a 30y o  year old male who presents for follow up after Index Finger Removal Of Foreign Body - Left and Index Finger Debridement (wash Out) - Left  Today patient denies any severe pain, no numbness or tingling no fevers or chills  He has been compliant in his splint since the operation  He does note stiffness in the finger          PHYSICAL EXAMINATION:  General: well developed and well nourished, alert, oriented times 3 and appears comfortable  Psychiatric: Normal    MUSCULOSKELETAL EXAMINATION:   Incision: Clean, dry, intact  Surgery Site: normal, no evidence of infection   Range of Motion: Limited due to stiffness  Neurovascular status: Neuro intact, good cap refill  Activity Restrictions: Restrictions: No lifting, pushing, pulling, etc over 5 lbs with the injured finger until the fracture is healed  Done today: Sutures out      STUDIES REVIEWED:  Pathology reviewed: copper colored, shiny, dante shaped foreign object, consistent with portion of a barbed nail   Images were reviewd in PACS:  Status post foreign body removal of the left index middle phalanx    Some lucency noted on the lateral views where the nail was removed        PROCEDURES PERFORMED:  Procedures  No Procedures performed today      Santos Dennis, PA-C

## 2019-11-25 ENCOUNTER — EVALUATION (OUTPATIENT)
Dept: OCCUPATIONAL THERAPY | Facility: CLINIC | Age: 29
End: 2019-11-25
Payer: OTHER MISCELLANEOUS

## 2019-11-25 DIAGNOSIS — S62.651D CLOSED NONDISPLACED FRACTURE OF MIDDLE PHALANX OF LEFT INDEX FINGER WITH ROUTINE HEALING, SUBSEQUENT ENCOUNTER: Primary | ICD-10-CM

## 2019-11-25 PROCEDURE — 97165 OT EVAL LOW COMPLEX 30 MIN: CPT | Performed by: OCCUPATIONAL THERAPIST

## 2019-11-25 NOTE — PROGRESS NOTES
OT Evaluation     Today's date: 2019  Patient name: Lit Zaman  : 1990  MRN: [id number]  Referring provider: Leela Neri PA-C  Dx:   Encounter Diagnosis     ICD-10-CM    1  Closed nondisplaced fracture of middle phalanx of left index finger with routine healing, subsequent encounter S69 147N                   Assessment  Assessment details: Marjorie Kelly is s/p L index finger closed middle phalanx fracture on 10/17/19 and  foreign body excision on 10/29/19  He was fabricated a radial gutter orthotic last week to wear for the next two weeks  This will be discharged later this week  He presents with edema, decreased ROM, scar tissue adherence, and impaired use of his left hand  There is a slight swan neck deformity in this finger which will be monitored  See below for a detailed assessment  Impairments: abnormal or restricted ROM, activity intolerance, impaired physical strength, lacks appropriate home exercise program and pain with function    Symptom irritability: lowBarriers to therapy: Lacking insurance  Understanding of Dx/Px/POC: good   Prognosis: good    Goals  STG: Patient will be compliant with orthotic and home exercise program in 1 week  STG: Pain will be reduced to a 2/10 during appropriate activity and during therapy in 3 weeks  STG: Inflammation/edema/joint effusion will be reduced to 5 3cm at the P2 and patient will be independent with self management techniques in 4 weeks  STG: Range of motion of left index finger will be improved to PIP=90, DIP= 55 degrees in 3 weeks  LTG: Strength will be improved to 50% the contralateral side in 6-8 weeks or discharge  LTG: Performance in ADLs and IADLS will be improved to prior level of function with the affected extremity within 6 weeks or discharge  LTG: Performance in work activity will be improved to prior level of function with the affected extremity within 6 weeks or discharge     LTG: FOTO score increase by 20 points within 6 weeks or discharge  Plan  Plan details: Treatment to include modalities, manual therapy, PRE's, HEP, and orthotics as appropriate  Patient would benefit from: skilled OT and OT eval  Planned modality interventions: thermotherapy: hydrocollator packs and thermotherapy: paraffin bath  Planned therapy interventions: home exercise program, joint mobilization, manual therapy, therapeutic exercise, patient education, therapeutic activities, stretching and strengthening  Frequency: 2x week  Duration in visits: [address] beginning date: 2019  Plan of Care expiration date: 2020  Treatment plan discussed with: patient        Subjective Evaluation    History of Present Illness  Date of onset: 10/17/2019  Date of surgery: 10/29/2019  Mechanism of injury: Irish Nina was working at home with a nail gun on 10/17/19 when a nail entered his left index finger  He sustained a closed middle phalanx fracture from this injury and also had remnants of the nail removed operatively on 10/29/19     Pain  Current pain ratin  At best pain ratin (While at rest)  At worst pain ratin    Social Support    Employment status: working (Works at [employer])  Hand dominance: right    Treatments  Current treatment: occupational therapy  Patient Goals  Patient goals for therapy: increased strength, decreased edema, decreased pain, increased motion, return to work and independence with ADLs/IADLs          Objective     Neurological Testing     Sensation     Wrist/Hand   Left   Intact: light touch    Comments   Left light touch: 2 83     Active Range of Motion     Left Digits    Flexion   Index     MCP: 70    PIP: 82    DIP: 43  Extension   Index     PIP: 15    DIP: -9    Right Digits   Flexion   Index     MCP: 70    PIP: 90    DIP: 55    Swelling     Left Wrist/Hand   Index     Proximal: 6 7 cm    Middle: 5 7 cm    Right Wrist/Hand   Index     Proximal: 6 5 cm    Middle: 5 1 cm             Precautions: Discharge orthotic 11/28/19      Manual  11/25            Scar tissue massage 5'                                                                    Exercise Diary  11/25            HEP: TG, blocking, scar massage Issued                                                                                                                                                                                                                                                                       Modalities  11/25            [facility] 5'

## 2019-12-03 ENCOUNTER — OFFICE VISIT (OUTPATIENT)
Dept: OCCUPATIONAL THERAPY | Facility: CLINIC | Age: 29
End: 2019-12-03
Payer: OTHER MISCELLANEOUS

## 2019-12-03 DIAGNOSIS — S62.651D CLOSED NONDISPLACED FRACTURE OF MIDDLE PHALANX OF LEFT INDEX FINGER WITH ROUTINE HEALING, SUBSEQUENT ENCOUNTER: Primary | ICD-10-CM

## 2019-12-03 PROCEDURE — 97530 THERAPEUTIC ACTIVITIES: CPT | Performed by: OCCUPATIONAL THERAPIST

## 2019-12-03 NOTE — PROGRESS NOTES
Daily Note     Today's date: 12/3/2019  Patient name: Roverto Barrera  : 1990  MRN: [id number]  Referring provider: Siva Cheung PA-C  Dx:   Encounter Diagnosis     ICD-10-CM    1  Closed nondisplaced fracture of middle phalanx of left index finger with routine healing, subsequent encounter [id number] 441B                   Subjective: He indicated that there was some discomfort in the intrinsic stretch position  Objective: See treatment diary below      Assessment: He discharged the orthotic as instructed on   Full motion of the digit  Minimal scar tissue adherence  Minimal pain  Good performance toady  Plan: MD follow up tomorrow  May return to work after this visit and may not require further therapy        Precautions: Universal      Manual  11/25 12/3           Scar tissue massage 5' 5'                                                                   Exercise Diary  11/25 12/3           HEP: TG, blocking, scar massage Issued            ROM to tolerance   3'           keypegs   1x           Digiflex  Green 20x           Power web  [employer]           Extension web  yellow 3x10           Pinch ring   G/B x2           Jar turning   orange 10/10                                                                                                                                                                            Modalities  11/25 12/3           [facility] 5' 5'

## 2019-12-04 ENCOUNTER — OFFICE VISIT (OUTPATIENT)
Dept: OBGYN CLINIC | Facility: CLINIC | Age: 29
End: 2019-12-04
Payer: OTHER MISCELLANEOUS

## 2019-12-04 ENCOUNTER — APPOINTMENT (OUTPATIENT)
Dept: RADIOLOGY | Facility: AMBULARY SURGERY CENTER | Age: 29
End: 2019-12-04
Attending: SURGERY
Payer: OTHER MISCELLANEOUS

## 2019-12-04 VITALS
HEIGHT: 63 IN | SYSTOLIC BLOOD PRESSURE: 143 MMHG | HEART RATE: 71 BPM | DIASTOLIC BLOOD PRESSURE: 88 MMHG | WEIGHT: 160 LBS | BODY MASS INDEX: 28.35 KG/M2

## 2019-12-04 DIAGNOSIS — S62.651D CLOSED NONDISPLACED FRACTURE OF MIDDLE PHALANX OF LEFT INDEX FINGER WITH ROUTINE HEALING, SUBSEQUENT ENCOUNTER: Primary | ICD-10-CM

## 2019-12-04 DIAGNOSIS — S62.651A CLOSED NONDISPLACED FRACTURE OF MIDDLE PHALANX OF LEFT INDEX FINGER, INITIAL ENCOUNTER: ICD-10-CM

## 2019-12-04 DIAGNOSIS — Z98.890 S/P MUSCULOSKELETAL SYSTEM SURGERY: ICD-10-CM

## 2019-12-04 PROCEDURE — 73140 X-RAY EXAM OF FINGER(S): CPT

## 2019-12-04 PROCEDURE — 99212 OFFICE O/P EST SF 10 MIN: CPT | Performed by: SURGERY

## 2019-12-04 NOTE — LETTER
December 4, 2019     Patient: Lavell Hopkins   YOB: 1990   Date of Visit: 12/4/2019       To Whom it May Concern:    Lavell Hopkins is under my professional care  He was seen in my office on 12/4/2019  He may return to work on 12/09/19, no restrictions  If you have any questions or concerns, please don't hesitate to call           Sincerely,          Natanael Spence MD

## 2019-12-04 NOTE — PROGRESS NOTES
Assessment/Plan:  Patient ID: Silvestre Queen 30 y o  male   Surgery: Index Finger Removal Of Foreign Body - Left and Index Finger Debridement (wash Out) - Left  Date of Surgery: 10/29/2019    5 weeks s/p left index finger I&D with removal of foreign body  May d/c the use of the radial gutter splint  Activities as tolerated  Return to work note was provided today  Follow up as needed if symptoms worsen or fail to improve  Sreedhar's accompany provided Setswana translation as needed  Follow Up:  PRN    To Do Next Visit:  Re-evaluation       CHIEF COMPLAINT:  Chief Complaint   Patient presents with    Left Index Finger - Post-op         SUBJECTIVE:  Silvestre Queen is a 30y o  year old male who presents for follow up after Index Finger Removal Of Foreign Body - Left and Index Finger Debridement (wash Out) - Left  Today patient has None and No Complaints  He has d/c the use of the radial gutter splint  He denies any pain today         PHYSICAL EXAMINATION:  General: well developed and well nourished, alert, oriented times 3 and appears comfortable  Psychiatric: Normal    MUSCULOSKELETAL EXAMINATION:  Incision: healed  Surgery Site: normal, no evidence of infection   Range of Motion: full DIP, PIP and MCP ROM, full composite fist possible  Neurovascular status: Neuro intact, good cap refill  Activity Restrictions: No restrictions      STUDIES REVIEWED:  Images were reviewd in PACS:   Demonstrates some small residual fragments of metal however large nail is removed and fracture centrally healed      PROCEDURES PERFORMED:  Procedures  No Procedures performed today       Scribe Attestation    I,:   Juliet Avila am acting as a scribe while in the presence of the attending physician :        I,:   Maddison Najera MD personally performed the services described in this documentation    as scribed in my presence :

## 2025-03-10 ENCOUNTER — HOSPITAL ENCOUNTER (EMERGENCY)
Facility: HOSPITAL | Age: 35
Discharge: HOME | End: 2025-03-11
Attending: EMERGENCY MEDICINE

## 2025-03-10 ENCOUNTER — APPOINTMENT (EMERGENCY)
Dept: RADIOLOGY | Facility: HOSPITAL | Age: 35
End: 2025-03-10

## 2025-03-10 VITALS
DIASTOLIC BLOOD PRESSURE: 89 MMHG | HEART RATE: 86 BPM | OXYGEN SATURATION: 100 % | SYSTOLIC BLOOD PRESSURE: 152 MMHG | TEMPERATURE: 97.5 F | RESPIRATION RATE: 18 BRPM

## 2025-03-10 DIAGNOSIS — R10.9 LEFT FLANK PAIN: Primary | ICD-10-CM

## 2025-03-10 DIAGNOSIS — R11.2 NAUSEA AND VOMITING: ICD-10-CM

## 2025-03-10 DIAGNOSIS — N20.0 KIDNEY STONE: ICD-10-CM

## 2025-03-10 LAB
ALBUMIN SERPL BCG-MCNC: 4.8 G/DL (ref 3.5–5)
ALP SERPL-CCNC: 88 U/L (ref 34–104)
ALT SERPL W P-5'-P-CCNC: 31 U/L (ref 7–52)
ANION GAP SERPL CALCULATED.3IONS-SCNC: 11 MMOL/L (ref 4–13)
AST SERPL W P-5'-P-CCNC: 24 U/L (ref 13–39)
ATRIAL RATE: 90 BPM
BACTERIA UR QL AUTO: ABNORMAL /HPF
BASOPHILS # BLD MANUAL: 0 THOUSAND/UL (ref 0–0.1)
BASOPHILS NFR MAR MANUAL: 0 % (ref 0–1)
BILIRUB SERPL-MCNC: 0.42 MG/DL (ref 0.2–1)
BILIRUB UR QL STRIP: NEGATIVE
BUN SERPL-MCNC: 14 MG/DL (ref 5–25)
CALCIUM SERPL-MCNC: 9.4 MG/DL (ref 8.4–10.2)
CHLORIDE SERPL-SCNC: 104 MMOL/L (ref 96–108)
CLARITY UR: CLEAR
CO2 SERPL-SCNC: 23 MMOL/L (ref 21–32)
COLOR UR: COLORLESS
CREAT SERPL-MCNC: 0.9 MG/DL (ref 0.6–1.3)
EOSINOPHIL # BLD MANUAL: 0 THOUSAND/UL (ref 0–0.4)
EOSINOPHIL NFR BLD MANUAL: 0 % (ref 0–6)
ERYTHROCYTE [DISTWIDTH] IN BLOOD BY AUTOMATED COUNT: 12.3 % (ref 11.6–15.1)
GFR SERPL CREATININE-BSD FRML MDRD: 111 ML/MIN/1.73SQ M
GLUCOSE SERPL-MCNC: 128 MG/DL (ref 65–140)
GLUCOSE UR STRIP-MCNC: NEGATIVE MG/DL
HCT VFR BLD AUTO: 46 % (ref 36.5–49.3)
HGB BLD-MCNC: 16 G/DL (ref 12–17)
HGB UR QL STRIP.AUTO: ABNORMAL
KETONES UR STRIP-MCNC: NEGATIVE MG/DL
LEUKOCYTE ESTERASE UR QL STRIP: NEGATIVE
LIPASE SERPL-CCNC: 25 U/L (ref 11–82)
LYMPHOCYTES # BLD AUTO: 0.64 THOUSAND/UL (ref 0.6–4.47)
LYMPHOCYTES # BLD AUTO: 3 % (ref 14–44)
MCH RBC QN AUTO: 30.6 PG (ref 26.8–34.3)
MCHC RBC AUTO-ENTMCNC: 34.8 G/DL (ref 31.4–37.4)
MCV RBC AUTO: 88 FL (ref 82–98)
MONOCYTES # BLD AUTO: 0.38 THOUSAND/UL (ref 0–1.22)
MONOCYTES NFR BLD: 3 % (ref 4–12)
NEUTROPHILS # BLD MANUAL: 11.71 THOUSAND/UL (ref 1.85–7.62)
NEUTS BAND NFR BLD MANUAL: 1 % (ref 0–8)
NEUTS SEG NFR BLD AUTO: 91 % (ref 43–75)
NITRITE UR QL STRIP: NEGATIVE
NON-SQ EPI CELLS URNS QL MICRO: ABNORMAL /HPF
P AXIS: 35 DEGREES
PH UR STRIP.AUTO: 8 [PH]
PLATELET # BLD AUTO: 240 THOUSANDS/UL (ref 149–390)
PLATELET BLD QL SMEAR: ADEQUATE
PMV BLD AUTO: 9.8 FL (ref 8.9–12.7)
POLYCHROMASIA BLD QL SMEAR: PRESENT
POTASSIUM SERPL-SCNC: 3.9 MMOL/L (ref 3.5–5.3)
PR INTERVAL: 132 MS
PROT SERPL-MCNC: 7.7 G/DL (ref 6.4–8.4)
PROT UR STRIP-MCNC: NEGATIVE MG/DL
QRS AXIS: 0 DEGREES
QRSD INTERVAL: 86 MS
QT INTERVAL: 346 MS
QTC INTERVAL: 424 MS
RBC # BLD AUTO: 5.23 MILLION/UL (ref 3.88–5.62)
RBC #/AREA URNS AUTO: ABNORMAL /HPF
RBC MORPH BLD: PRESENT
SODIUM SERPL-SCNC: 138 MMOL/L (ref 135–147)
SP GR UR STRIP.AUTO: 1.02 (ref 1–1.03)
T WAVE AXIS: 3 DEGREES
UROBILINOGEN UR STRIP-ACNC: <2 MG/DL
VARIANT LYMPHS # BLD AUTO: 2 %
VENTRICULAR RATE: 90 BPM
WBC # BLD AUTO: 12.73 THOUSAND/UL (ref 4.31–10.16)
WBC #/AREA URNS AUTO: ABNORMAL /HPF

## 2025-03-10 PROCEDURE — 85007 BL SMEAR W/DIFF WBC COUNT: CPT

## 2025-03-10 PROCEDURE — 96374 THER/PROPH/DIAG INJ IV PUSH: CPT

## 2025-03-10 PROCEDURE — 85027 COMPLETE CBC AUTOMATED: CPT

## 2025-03-10 PROCEDURE — 99284 EMERGENCY DEPT VISIT MOD MDM: CPT

## 2025-03-10 PROCEDURE — 83690 ASSAY OF LIPASE: CPT

## 2025-03-10 PROCEDURE — 96375 TX/PRO/DX INJ NEW DRUG ADDON: CPT

## 2025-03-10 PROCEDURE — 93010 ELECTROCARDIOGRAM REPORT: CPT | Performed by: INTERNAL MEDICINE

## 2025-03-10 PROCEDURE — 36415 COLL VENOUS BLD VENIPUNCTURE: CPT

## 2025-03-10 PROCEDURE — 81001 URINALYSIS AUTO W/SCOPE: CPT

## 2025-03-10 PROCEDURE — 80053 COMPREHEN METABOLIC PANEL: CPT

## 2025-03-10 PROCEDURE — 96361 HYDRATE IV INFUSION ADD-ON: CPT

## 2025-03-10 PROCEDURE — 99285 EMERGENCY DEPT VISIT HI MDM: CPT | Performed by: EMERGENCY MEDICINE

## 2025-03-10 PROCEDURE — 93005 ELECTROCARDIOGRAM TRACING: CPT

## 2025-03-10 PROCEDURE — 74176 CT ABD & PELVIS W/O CONTRAST: CPT

## 2025-03-10 RX ORDER — OXYCODONE HYDROCHLORIDE 5 MG/1
5 TABLET ORAL EVERY 4 HOURS PRN
Qty: 11 TABLET | Refills: 0 | Status: SHIPPED | OUTPATIENT
Start: 2025-03-10 | End: 2025-03-14 | Stop reason: SDUPTHER

## 2025-03-10 RX ORDER — HYDROMORPHONE HCL IN WATER/PF 6 MG/30 ML
0.2 PATIENT CONTROLLED ANALGESIA SYRINGE INTRAVENOUS ONCE
Status: COMPLETED | OUTPATIENT
Start: 2025-03-10 | End: 2025-03-10

## 2025-03-10 RX ORDER — KETOROLAC TROMETHAMINE 30 MG/ML
15 INJECTION, SOLUTION INTRAMUSCULAR; INTRAVENOUS ONCE
Status: COMPLETED | OUTPATIENT
Start: 2025-03-10 | End: 2025-03-10

## 2025-03-10 RX ORDER — ONDANSETRON 2 MG/ML
4 INJECTION INTRAMUSCULAR; INTRAVENOUS ONCE
Status: COMPLETED | OUTPATIENT
Start: 2025-03-10 | End: 2025-03-10

## 2025-03-10 RX ORDER — ONDANSETRON 4 MG/1
4 TABLET, FILM COATED ORAL EVERY 6 HOURS
Qty: 12 TABLET | Refills: 0 | Status: SHIPPED | OUTPATIENT
Start: 2025-03-10 | End: 2025-03-30

## 2025-03-10 RX ORDER — HYDROMORPHONE HCL/PF 1 MG/ML
0.5 SYRINGE (ML) INJECTION ONCE
Status: COMPLETED | OUTPATIENT
Start: 2025-03-10 | End: 2025-03-10

## 2025-03-10 RX ORDER — TAMSULOSIN HYDROCHLORIDE 0.4 MG/1
0.4 CAPSULE ORAL
Qty: 10 CAPSULE | Refills: 0 | Status: SHIPPED | OUTPATIENT
Start: 2025-03-10 | End: 2025-03-14 | Stop reason: SDUPTHER

## 2025-03-10 RX ORDER — ACETAMINOPHEN 325 MG/1
975 TABLET ORAL ONCE
Status: COMPLETED | OUTPATIENT
Start: 2025-03-10 | End: 2025-03-10

## 2025-03-10 RX ADMIN — SODIUM CHLORIDE 1000 ML: 0.9 INJECTION, SOLUTION INTRAVENOUS at 21:22

## 2025-03-10 RX ADMIN — HYDROMORPHONE HYDROCHLORIDE 0.2 MG: 0.2 INJECTION, SOLUTION INTRAMUSCULAR; INTRAVENOUS; SUBCUTANEOUS at 21:26

## 2025-03-10 RX ADMIN — HYDROMORPHONE HYDROCHLORIDE 0.5 MG: 1 INJECTION, SOLUTION INTRAMUSCULAR; INTRAVENOUS; SUBCUTANEOUS at 23:39

## 2025-03-10 RX ADMIN — KETOROLAC TROMETHAMINE 15 MG: 30 INJECTION, SOLUTION INTRAMUSCULAR; INTRAVENOUS at 21:24

## 2025-03-10 RX ADMIN — ONDANSETRON 4 MG: 2 INJECTION INTRAMUSCULAR; INTRAVENOUS at 21:22

## 2025-03-10 RX ADMIN — ACETAMINOPHEN 975 MG: 325 TABLET, FILM COATED ORAL at 21:24

## 2025-03-11 NOTE — DISCHARGE INSTRUCTIONS
"You were seen in the Emergency Department today for left flank pain, vomiting.   You have a kidney stone on the left side.   CT imaging showed:  \"There is 7 mm obstructing stone at the left mid ureter with resultant hydroureteronephrosis and left perinephric stranding.\"    Can take Tylenol and Motrin as needed every 6 hours for mild pain. Can take the prescribed roxicodone as needed for severe pain not improved with Tylenol/Motrin.  Take the prescribed Flomax to help with passing of the stone.   Take the prescribed Zofran as needed every 6 hours for nausea/vomiting.    A urology referral was placed. Please follow up with urology as soon as able for re-evaluation and further management.  Please return to the Emergency Department if you experience worsening of your current symptoms or any other concerning symptoms including fever, chills, uncontrolled pain, uncontrolled vomiting.     "

## 2025-03-11 NOTE — ED ATTENDING ATTESTATION
Final Diagnoses:     1. Left flank pain    2. Nausea and vomiting    3. Kidney stone      ED Course as of 03/10/25 2358   Mon Mar 10, 2025   2113 Bands %: 1   2113 WBC(!): 12.73   2113 Hemoglobin: 16.0   2113 Platelet Count: 240   2113 Potassium: 3.9   2113 Creatinine: 0.90   2309 RBC Urine(!): 30-50   2309 Bacteria, UA: None Seen   2358 Discussed wit hurology.        I, Darin Grubbs MD, saw and evaluated the patient. All available labs and X-rays were ordered by me or the resident / non-physician and have been reviewed by myself. I discussed the patient with the resident / non-physician and agree with the resident's / non-physician practitioner's findings and plan as documented in the resident's / non-physician practicitioner's note, except where noted.   At this point, I agree with the current assessment done in the ED.   I was present during key portions of all procedures performed unless otherwise stated.     HPI:  NURSING TRIAGE:    This is a 34 y.o. male presenting for evaluation of LEFT flank pain xince 2:30PM  Constant  Fluctuantes  Hx of simialr 2 weeks ago, lasted 10 minutes, resolved.  +nausea  +vomiting NBNB x2  No f/ch/cp/sob.  Denies any urinary tract infection symptoms (burning, itching, pain, blood, frequency).  Denies any upper respiratory tract infection symptoms (cough, congestion, rhinorrhea, sore throat).  Chief Complaint   Patient presents with    Abdominal Pain     Pt c/o LUQ pain radiating into his back since 1400      PHYSICAL: ASSESSMENT + PLAN:   Pertinent: LEFT lower belly tenderness  No L CVAT  No rebound  No guarding  But appears uncomfortable.     General: VS reviewed  awake, alert.   Well-nourished, well-developed. Appears stated age.   Speaking normally in full sentences.   Head: Normocephalic, atraumatic  Eyes: EOM-I. No diplopia.   No hyphema.   No subconjunctival hemorrhages.  Symmetrical lids.   ENT: Atraumatic external nose and ears.    MMM  No malocclusion. No stridor.  Normal phonation. No drooling. Normal swallowing.   Neck: No JVD.  CV: No pallor noted  No tachycardia  Lungs:   No tachypnea  No respiratory distress  Abd: as above   MSK:   FROM spontaneously  Skin: Dry, intact.   Neuro: Awake, alert, GCS15, CN II-XII grossly intact.   Motor grossly intact.  Psychiatric/Behavioral: interacting normally; appropriate mood/affect.    Exam: deferred    Vitals:    03/10/25 1852 03/10/25 2121   BP: 169/99 152/89   BP Location: Right arm Right arm   Pulse: 95 86   Resp: 18 18   Temp: 97.5 °F (36.4 °C)    TempSrc: Temporal    SpO2: 98% 100%    - given the presentation, will check CBC for marked leukocytosis  - CMP for liver enzyme elevation that could signal cholecystitis, biliary obstructive disease. Check RFTs for TEJAS / markers of dehydration.  - Lipase given abdominal pain to evaluate specifically for pancreatitis.  - Urine: will check for UTI or signs of pyelonephritis.   - Lastly, will consider abdominal imaging.  - Renal stone protocol CT: highest on my differential is a stone, will do imaging particularly for this looking for hydro or the stone itself.   - Disposition per workup.      There are no obvious limitations to social determinants of care.   Nursing note reviewed.   Vitals reviewed.   Orders placed by myself and/or advanced practitioner / resident.    Previous chart was reviewed  History obtained from: Patient  Language barrier: None  Limitations to the history obtained: None Critical Care Time:      Past Medical: Past Surgical:    has no past medical history on file.  has a past surgical history that includes Gum surgery; pr dbrdmt fx&/dislc subq t/m/f bone (Left, 10/29/2019); and pr dbrdmt w/rmvl fm fx&/dislc skin&subq tissus (Left, 10/29/2019).   Social: Cardiac (Echo/Cath)   Social History     Substance and Sexual Activity   Alcohol Use No     Social History     Tobacco Use   Smoking Status Never   Smokeless Tobacco Never     Social History     Substance and Sexual  Activity   Drug Use No    No results found for this or any previous visit.    No results found for this or any previous visit.    No results found for this or any previous visit.     Labs: Imaging:   Labs Reviewed   CBC AND DIFFERENTIAL - Abnormal       Result Value Ref Range Status    WBC 12.73 (*) 4.31 - 10.16 Thousand/uL Final    RBC 5.23  3.88 - 5.62 Million/uL Final    Hemoglobin 16.0  12.0 - 17.0 g/dL Final    Hematocrit 46.0  36.5 - 49.3 % Final    MCV 88  82 - 98 fL Final    MCH 30.6  26.8 - 34.3 pg Final    MCHC 34.8  31.4 - 37.4 g/dL Final    RDW 12.3  11.6 - 15.1 % Final    MPV 9.8  8.9 - 12.7 fL Final    Platelets 240  149 - 390 Thousands/uL Final    Narrative:     This is an appended report.  These results have been appended to a previously verified report.   UA W REFLEX TO MICROSCOPIC WITH REFLEX TO CULTURE - Abnormal    Color, UA Colorless   Final    Clarity, UA Clear   Final    Specific Gravity, UA 1.017  1.003 - 1.030 Final    pH, UA 8.0  4.5, 5.0, 5.5, 6.0, 6.5, 7.0, 7.5, 8.0 Final    Leukocytes, UA Negative  Negative Final    Nitrite, UA Negative  Negative Final    Protein, UA Negative  Negative mg/dl Final    Glucose, UA Negative  Negative mg/dl Final    Ketones, UA Negative  Negative mg/dl Final    Urobilinogen, UA <2.0  <2.0 mg/dl mg/dl Final    Bilirubin, UA Negative  Negative Final    Occult Blood, UA Small (*) Negative Final   URINE MICROSCOPIC - Abnormal    RBC, UA 30-50 (*) None Seen, 1-2 /hpf Final    WBC, UA 1-2  None Seen, 1-2 /hpf Final    Epithelial Cells None Seen  None Seen, Occasional /hpf Final    Bacteria, UA None Seen  None Seen, Occasional /hpf Final   MANUAL DIFFERENTIAL(PHLEBS DO NOT ORDER) - Abnormal    Segmented % 91 (*) 43 - 75 % Final    Bands % 1  0 - 8 % Final    Lymphocytes % 3 (*) 14 - 44 % Final    Monocytes % 3 (*) 4 - 12 % Final    Eosinophils % 0  0 - 6 % Final    Basophils % 0  0 - 1 % Final    Atypical Lymphocytes % 2 (*) <=0 % Final    Absolute Neutrophils  11.71 (*) 1.85 - 7.62 Thousand/uL Final    Absolute Lymphocytes 0.64  0.60 - 4.47 Thousand/uL Final    Absolute Monocytes 0.38  0.00 - 1.22 Thousand/uL Final    Absolute Eosinophils 0.00  0.00 - 0.40 Thousand/uL Final    Absolute Basophils 0.00  0.00 - 0.10 Thousand/uL Final    Total Counted     Final    RBC Morphology Present   Final    Platelet Estimate Adequate  Adequate Final    Polychromasia Present   Final   LIPASE - Normal    Lipase 25  11 - 82 u/L Final   COMPREHENSIVE METABOLIC PANEL    Sodium 138  135 - 147 mmol/L Final    Potassium 3.9  3.5 - 5.3 mmol/L Final    Chloride 104  96 - 108 mmol/L Final    CO2 23  21 - 32 mmol/L Final    ANION GAP 11  4 - 13 mmol/L Final    BUN 14  5 - 25 mg/dL Final    Creatinine 0.90  0.60 - 1.30 mg/dL Final    Comment: Standardized to IDMS reference method    Glucose 128  65 - 140 mg/dL Final    Comment: If the patient is fasting, the ADA then defines impaired fasting glucose as > 100 mg/dL and diabetes as > or equal to 123 mg/dL.    Calcium 9.4  8.4 - 10.2 mg/dL Final    AST 24  13 - 39 U/L Final    ALT 31  7 - 52 U/L Final    Comment: Specimen collection should occur prior to Sulfasalazine administration due to the potential for falsely depressed results.     Alkaline Phosphatase 88  34 - 104 U/L Final    Total Protein 7.7  6.4 - 8.4 g/dL Final    Albumin 4.8  3.5 - 5.0 g/dL Final    Total Bilirubin 0.42  0.20 - 1.00 mg/dL Final    Comment: Use of this assay is not recommended for patients undergoing treatment with eltrombopag due to the potential for falsely elevated results.  N-acetyl-p-benzoquinone imine (metabolite of Acetaminophen) will generate erroneously low results in samples for patients that have taken an overdose of Acetaminophen.    eGFR 111  ml/min/1.73sq m Final    Narrative:     National Kidney Disease Foundation guidelines for Chronic Kidney Disease (CKD):     Stage 1 with normal or high GFR (GFR > 90 mL/min/1.73 square meters)    Stage 2 Mild CKD (GFR =  60-89 mL/min/1.73 square meters)    Stage 3A Moderate CKD (GFR = 45-59 mL/min/1.73 square meters)    Stage 3B Moderate CKD (GFR = 30-44 mL/min/1.73 square meters)    Stage 4 Severe CKD (GFR = 15-29 mL/min/1.73 square meters)    Stage 5 End Stage CKD (GFR <15 mL/min/1.73 square meters)  Note: GFR calculation is accurate only with a steady state creatinine   RBC MORPHOLOGY REFLEX TEST    CT renal stone study abdomen pelvis wo contrast   Final Result      There is 7 mm obstructing stone at the left mid ureter with resultant hydroureteronephrosis and left perinephric stranding. Recommend urological consultation         I personally discussed this study with ДМИТРИЙ MOORE on 3/10/2025 10:21 PM.            Workstation performed: [id number]            Medications: Code Status:   Medications   ketorolac (TORADOL) injection 15 mg (15 mg Intravenous Given 3/10/25 2124)   acetaminophen (TYLENOL) tablet 975 mg (975 mg Oral Given 3/10/25 2124)   ondansetron (ZOFRAN) injection 4 mg (4 mg Intravenous Given 3/10/25 2122)   HYDROmorphone HCl (DILAUDID) injection 0.2 mg (0.2 mg Intravenous Given 3/10/25 2126)   sodium chloride 0.9 % bolus 1,000 mL (0 mL Intravenous Stopped 3/10/25 2233)   HYDROmorphone (DILAUDID) injection 0.5 mg (0.5 mg Intravenous Given 3/10/25 2339)    Code Status: No Order  Advance Directive and Living Will:      Power of :    POLST:       Orders Placed This Encounter   Procedures    CT renal stone study abdomen pelvis wo contrast    CBC and differential    Comprehensive metabolic panel    Lipase    RBC Morphology Reflex Test    UA w Reflex to Microscopic w Reflex to Culture    Urine Microscopic    Ambulatory Referral to Urology    Insert peripheral IV    Notify physician    Insert peripheral IV    ECG 12 lead    ECG 12 lead     Time reflects when diagnosis was documented in both MDM as applicable and the Disposition within this note       Time User Action Codes Description Comment    3/10/2025 11:26  PM Kita Oleary TREMAYNE Add [R10.9] Left flank pain     3/10/2025 11:26 PM Kita Oleary TREMAYNE Add [R11.2] Nausea and vomiting     3/10/2025 11:26 PM Kita Oleary Add [N20.0] Kidney stone           ED Disposition       ED Disposition   Discharge    Condition   Stable    Date/Time   Mon Mar 10, 2025 11:25 PM    Comment   Sreedhar Grissom discharge to home/self care.                   Follow-up Information       Follow up With Specialties Details Why Contact Info Additional Information    [facility] Emergency Medicine  If symptoms worsen [address] [facility] [address]  [phone] [facility], [address] [place], [place], [address]   [phone]    [facility]   [address]  [place] [address] [place] [id number] [phone] [facility] [place], [facility] [place] [address] [place] [address], [address] [phone]          Patient's Medications   Discharge Prescriptions    ONDANSETRON (ZOFRAN) 4 MG TABLET    Take 1 tablet (4 mg total) by mouth every 6 (six) hours       Start Date: 3/10/2025 End Date: --       Order Dose: 4 mg       Quantity: 12 tablet    Refills: 0    OXYCODONE (ROXICODONE) 5 IMMEDIATE RELEASE TABLET    Take 1 tablet (5 mg total) by mouth every 4 (four) hours as needed for moderate pain Max Daily Amount: 30 mg       Start Date: 3/10/2025 End Date: --       Order Dose: 5 mg       Quantity: 11 tablet    Refills: 0    TAMSULOSIN (FLOMAX) 0.4 MG    Take 1 capsule (0.4 mg total) by mouth daily with dinner for 10 days       Start Date: 3/10/2025 End Date: 3/20/2025       Order Dose: 0.4 mg       Quantity: 10 capsule    Refills: 0       Prior to Admission Medications   Prescriptions Last Dose Informant Patient Reported? Taking?   ibuprofen (MOTRIN) 600 mg tablet   No No   Sig: Take 1 tablet (600 mg total) by mouth every 6 (six) hours as needed for mild  "pain or fever for up to 20 doses.      Facility-Administered Medications: None                        Portions of the record may have been created with voice recognition software. Occasional wrong word or \"sound a like\" substitutions may have occurred due to the inherent limitations of voice recognition software. Read the chart carefully and recognize, using context, where substitutions have occurred.    Electronically signed by:  Darin Grubbs  "

## 2025-03-13 ENCOUNTER — TELEPHONE (OUTPATIENT)
Age: 35
End: 2025-03-13

## 2025-03-13 NOTE — TELEPHONE ENCOUNTER
:     New Patient     Appointment Scheduling:  What office location does the patient prefer?:  What is the reason for the patient's appointment?:R10.9 (ICD-10-CM) - Left flank pain  N20.0 (ICD-10-CM) - Kidney stone   Have patient records been requested?:  If No, are the records showing in Epic:   Appointment Details: Date 3/14/  Time:  1140  Location: [facility]   Provider:  Edelmira   Does the appointment need further review? (Reason)        HISTORY:   Is the patient having active symptoms? If so, describe symptoms: pain, nausea at times pain controlled by Tylenol or the oxycodone when needed    Has the patient had any previous Urologist(s)?:no  Was the patient seen in the ED?:yes  Has the patient had any outside testing done?:no  Does patient have Imaging/Lab Results:  IMPRESSION:     There is 7 mm obstructing stone at the left mid ureter with resultant hydroureteronephrosis and left perinephric stranding. Recommend urological consultation  Does the patient have a personal history of any cancer?:     INSURANCE:   Have you confirmed Patient's insurance? No insurance  Is the insurance accepted?    Is the insurance active?

## 2025-03-14 ENCOUNTER — NURSE TRIAGE (OUTPATIENT)
Dept: OTHER | Facility: OTHER | Age: 35
End: 2025-03-14

## 2025-03-14 ENCOUNTER — CONSULT (OUTPATIENT)
Dept: UROLOGY | Facility: CLINIC | Age: 35
End: 2025-03-14

## 2025-03-14 VITALS
HEART RATE: 86 BPM | HEIGHT: 63 IN | DIASTOLIC BLOOD PRESSURE: 100 MMHG | OXYGEN SATURATION: 98 % | BODY MASS INDEX: 28.34 KG/M2 | SYSTOLIC BLOOD PRESSURE: 162 MMHG

## 2025-03-14 DIAGNOSIS — N20.0 KIDNEY STONE: ICD-10-CM

## 2025-03-14 DIAGNOSIS — R10.9 LEFT FLANK PAIN: ICD-10-CM

## 2025-03-14 DIAGNOSIS — N13.2 HYDRONEPHROSIS WITH URINARY OBSTRUCTION DUE TO URETERAL CALCULUS: ICD-10-CM

## 2025-03-14 PROCEDURE — 99204 OFFICE O/P NEW MOD 45 MIN: CPT | Performed by: PHYSICIAN ASSISTANT

## 2025-03-14 RX ORDER — ACETAMINOPHEN 325 MG/1
650 TABLET ORAL EVERY 6 HOURS PRN
Qty: 30 TABLET | Refills: 0 | Status: SHIPPED | OUTPATIENT
Start: 2025-03-14

## 2025-03-14 RX ORDER — OXYCODONE HYDROCHLORIDE 5 MG/1
5 TABLET ORAL EVERY 4 HOURS PRN
Qty: 10 TABLET | Refills: 0 | Status: SHIPPED | OUTPATIENT
Start: 2025-03-14

## 2025-03-14 RX ORDER — IBUPROFEN 600 MG/1
600 TABLET, FILM COATED ORAL EVERY 6 HOURS PRN
Qty: 30 TABLET | Refills: 0 | Status: SHIPPED | OUTPATIENT
Start: 2025-03-14

## 2025-03-14 RX ORDER — ACETAMINOPHEN 325 MG/1
650 TABLET ORAL EVERY 6 HOURS PRN
COMMUNITY
End: 2025-03-14 | Stop reason: SDUPTHER

## 2025-03-14 RX ORDER — TAMSULOSIN HYDROCHLORIDE 0.4 MG/1
0.4 CAPSULE ORAL
Qty: 10 CAPSULE | Refills: 0 | Status: SHIPPED | OUTPATIENT
Start: 2025-03-14 | End: 2025-03-24

## 2025-03-14 NOTE — H&P (VIEW-ONLY)
Name: Sreedhar Grissom      : 1990      MRN: [id number]  Encounter Provider: Edelmira Zuleta PA-C  Encounter Date: 3/14/2025   Encounter department: [facility] [place] [facility]  :  Assessment & Plan  Left flank pain  s/t left midureteral calculus with hydronephrosis, see plan below  Orders:    Ambulatory Referral to Urology    Case request operating room: CYSTOSCOPY URETEROSCOPY WITH LITHOTRIPSY HOLMIUM LASER, RETROGRADE PYELOGRAM AND INSERTION STENT URETERAL; Standing    Hydronephrosis with urinary obstruction due to ureteral calculus  Acutely symptomatic left flank pain some intermittent nausea no further vomiting, no fevers.  Symptoms have been going for 4 days.  Was seen in the ER.  He has a 7 mm left mid ureteral stone.  His pain is now primarily in the left lower quadrant.  He has urinary frequency.    I showed him and his stepmother the CT images of his stone and the upstream hydroureteronephrosis.  His symptoms match the images.  He is taking Flomax, and as needed oxycodone at home.  He has not been taking Tylenol and Motrin.  He has used 1 or 2 of the Zofran's.    Yesterday he felt like his pain was improving and he was more comfortable doing things throughout the day, but today he feels like his pain is increasing almost to the level that it was on the very first ER visit.    There is a chance he may pass a stone naturally but also chance that he does not, he is also having severe pain.  I recommend scheduling him for surgery.  I told him to go back to the hospital at [facility] if he is more severe pain or vomiting or any fevers at all during the weekend.  Otherwise we will refill his medications and try to keep him comfortable to get surgery as an outpatient.    I went over the risk benefits and technique of CYSTOSCOPY, LEFT URETEROSCOPY, LASER LITHOTRIPSY, BASKET STONE EXTRACTION, RETROGRADE PYELOGRAM, URETERAL STENT INSERTION.  Patient counselled on RISK of stone surgery-  bleeding, urinary/blood stream infection, worsening pain, urinary retention, urinary incontinence, the need for anesthesia and potential for stent colic/prolonged stenting postop, or unable to complete procedure if stone not accessible given location in lower pole, or need for repeated procedures or nephrostomy tube and alternative procedure(s).    He would like to proceed with scheduling.  He tells me he is concerned about cost because he is uninsured/self-pay.    Orders:    Ambulatory Referral to Urology    ibuprofen (MOTRIN) 600 mg tablet; Take 1 tablet (600 mg total) by mouth every 6 (six) hours as needed (pain)    acetaminophen (TYLENOL) 325 mg tablet; Take 2 tablets (650 mg total) by mouth every 6 (six) hours as needed (pain)    Case request operating room: CYSTOSCOPY URETEROSCOPY WITH LITHOTRIPSY HOLMIUM LASER, RETROGRADE PYELOGRAM AND INSERTION STENT URETERAL; Standing        History of Present Illness   Sreedhar Grissom is a 34 y.o. male with no other medical history who presents as an ER follow-up acute left 7 mm mid ureteral calculus he presented earlier this week with acute onset left flank pain and nausea.  He has been managing his pain at home with Tylenol and oxycodone.  He is taking the Flomax prescribed.  He has not had vomiting or fevers.  He is voiding okay on his own.  He is accompanied today by his stepmother who also provides translation Danish to English at the patient's request.    On my review of the ER workup urinalysis was unremarkable aside from microscopic hematuria, WBC 12.7, creatinine 0.9, he was treated with Toradol, Dilaudid and Zofran.    Review of Systems   Constitutional:  Negative for activity change, appetite change, chills, fever and unexpected weight change.   HENT: Negative.     Respiratory: Negative.  Negative for shortness of breath.    Cardiovascular: Negative.  Negative for chest pain.   Gastrointestinal:  Positive for nausea. Negative for abdominal pain, diarrhea and  "vomiting.   Endocrine: Negative.    Genitourinary:  Positive for flank pain and frequency. Negative for decreased urine volume, difficulty urinating, dysuria, hematuria, scrotal swelling, testicular pain and urgency.   Musculoskeletal:  Negative for back pain and gait problem.   Skin: Negative.    Allergic/Immunologic: Negative.    Neurological: Negative.    Hematological:  Negative for adenopathy. Does not bruise/bleed easily.          Objective   /100 (BP Location: Left arm, Patient Position: Sitting, Cuff Size: Adult)   Pulse 86   Ht 5' 3\" (1.6 m)   SpO2 98%   BMI 28.34 kg/m²     Physical Exam  Constitutional:       General: He is not in acute distress.     Appearance: Normal appearance. He is not ill-appearing or diaphoretic.   HENT:      Head: Normocephalic and atraumatic.   Cardiovascular:      Rate and Rhythm: Normal rate and regular rhythm.      Pulses: Normal pulses.      Heart sounds: Normal heart sounds.   Pulmonary:      Effort: Pulmonary effort is normal.      Breath sounds: Normal breath sounds.   Abdominal:      General: Abdomen is flat.      Palpations: Abdomen is soft.      Tenderness: There is abdominal tenderness (llq). There is no right CVA tenderness or left CVA tenderness.   Musculoskeletal:      Right lower leg: No edema.      Left lower leg: No edema.   Skin:     Coloration: Skin is not pale.      Findings: No rash.   Neurological:      General: No focal deficit present.      Mental Status: He is alert. Mental status is at baseline.      Gait: Gait normal.   Psychiatric:         Mood and Affect: Mood normal.           Results   No results found for: \"PSA\"  Lab Results   Component Value Date    CALCIUM 9.4 03/10/2025    K 3.9 03/10/2025    CO2 23 03/10/2025     03/10/2025    BUN 14 03/10/2025    CREATININE 0.90 03/10/2025     Lab Results   Component Value Date    WBC 12.73 (H) 03/10/2025    HGB 16.0 03/10/2025    HCT 46.0 03/10/2025    MCV 88 03/10/2025     03/10/2025 "       Office Urine Dip  No results found for this or any previous visit (from the past hour).

## 2025-03-14 NOTE — PROGRESS NOTES
Name: Sreedhar Grissom      : 1990      MRN: [id number]  Encounter Provider: Edelmira Zuleta PA-C  Encounter Date: 3/14/2025   Encounter department: [facility] UROLOGY [place] [facility]  :  Assessment & Plan  Left flank pain  s/t left midureteral calculus with hydronephrosis, see plan below  Orders:    Ambulatory Referral to Urology    Case request operating room: CYSTOSCOPY URETEROSCOPY WITH LITHOTRIPSY HOLMIUM LASER, RETROGRADE PYELOGRAM AND INSERTION STENT URETERAL; Standing    Hydronephrosis with urinary obstruction due to ureteral calculus  Acutely symptomatic left flank pain some intermittent nausea no further vomiting, no fevers.  Symptoms have been going for 4 days.  Was seen in the ER.  He has a 7 mm left mid ureteral stone.  His pain is now primarily in the left lower quadrant.  He has urinary frequency.    I showed him and his stepmother the CT images of his stone and the upstream hydroureteronephrosis.  His symptoms match the images.  He is taking Flomax, and as needed oxycodone at home.  He has not been taking Tylenol and Motrin.  He has used 1 or 2 of the Zofran's.    Yesterday he felt like his pain was improving and he was more comfortable doing things throughout the day, but today he feels like his pain is increasing almost to the level that it was on the very first ER visit.    There is a chance he may pass a stone naturally but also chance that he does not, he is also having severe pain.  I recommend scheduling him for surgery.  I told him to go back to the hospital at [facility] if he is more severe pain or vomiting or any fevers at all during the weekend.  Otherwise we will refill his medications and try to keep him comfortable to get surgery as an outpatient.    I went over the risk benefits and technique of CYSTOSCOPY, LEFT URETEROSCOPY, LASER LITHOTRIPSY, BASKET STONE EXTRACTION, RETROGRADE PYELOGRAM, URETERAL STENT INSERTION.  Patient counselled on RISK of stone surgery-  bleeding, urinary/blood stream infection, worsening pain, urinary retention, urinary incontinence, the need for anesthesia and potential for stent colic/prolonged stenting postop, or unable to complete procedure if stone not accessible given location in lower pole, or need for repeated procedures or nephrostomy tube and alternative procedure(s).    He would like to proceed with scheduling.  He tells me he is concerned about cost because he is uninsured/self-pay.    Orders:    Ambulatory Referral to Urology    ibuprofen (MOTRIN) 600 mg tablet; Take 1 tablet (600 mg total) by mouth every 6 (six) hours as needed (pain)    acetaminophen (TYLENOL) 325 mg tablet; Take 2 tablets (650 mg total) by mouth every 6 (six) hours as needed (pain)    Case request operating room: CYSTOSCOPY URETEROSCOPY WITH LITHOTRIPSY HOLMIUM LASER, RETROGRADE PYELOGRAM AND INSERTION STENT URETERAL; Standing        History of Present Illness   Sreedhar Grissom is a 34 y.o. male with no other medical history who presents as an ER follow-up acute left 7 mm mid ureteral calculus he presented earlier this week with acute onset left flank pain and nausea.  He has been managing his pain at home with Tylenol and oxycodone.  He is taking the Flomax prescribed.  He has not had vomiting or fevers.  He is voiding okay on his own.  He is accompanied today by his stepmother who also provides translation Greek to English at the patient's request.    On my review of the ER workup urinalysis was unremarkable aside from microscopic hematuria, WBC 12.7, creatinine 0.9, he was treated with Toradol, Dilaudid and Zofran.    Review of Systems   Constitutional:  Negative for activity change, appetite change, chills, fever and unexpected weight change.   HENT: Negative.     Respiratory: Negative.  Negative for shortness of breath.    Cardiovascular: Negative.  Negative for chest pain.   Gastrointestinal:  Positive for nausea. Negative for abdominal pain, diarrhea and  "vomiting.   Endocrine: Negative.    Genitourinary:  Positive for flank pain and frequency. Negative for decreased urine volume, difficulty urinating, dysuria, hematuria, scrotal swelling, testicular pain and urgency.   Musculoskeletal:  Negative for back pain and gait problem.   Skin: Negative.    Allergic/Immunologic: Negative.    Neurological: Negative.    Hematological:  Negative for adenopathy. Does not bruise/bleed easily.          Objective   /100 (BP Location: Left arm, Patient Position: Sitting, Cuff Size: Adult)   Pulse 86   Ht 5' 3\" (1.6 m)   SpO2 98%   BMI 28.34 kg/m²     Physical Exam  Constitutional:       General: He is not in acute distress.     Appearance: Normal appearance. He is not ill-appearing or diaphoretic.   HENT:      Head: Normocephalic and atraumatic.   Cardiovascular:      Rate and Rhythm: Normal rate and regular rhythm.      Pulses: Normal pulses.      Heart sounds: Normal heart sounds.   Pulmonary:      Effort: Pulmonary effort is normal.      Breath sounds: Normal breath sounds.   Abdominal:      General: Abdomen is flat.      Palpations: Abdomen is soft.      Tenderness: There is abdominal tenderness (llq). There is no right CVA tenderness or left CVA tenderness.   Musculoskeletal:      Right lower leg: No edema.      Left lower leg: No edema.   Skin:     Coloration: Skin is not pale.      Findings: No rash.   Neurological:      General: No focal deficit present.      Mental Status: He is alert. Mental status is at baseline.      Gait: Gait normal.   Psychiatric:         Mood and Affect: Mood normal.           Results   No results found for: \"PSA\"  Lab Results   Component Value Date    CALCIUM 9.4 03/10/2025    K 3.9 03/10/2025    CO2 23 03/10/2025     03/10/2025    BUN 14 03/10/2025    CREATININE 0.90 03/10/2025     Lab Results   Component Value Date    WBC 12.73 (H) 03/10/2025    HGB 16.0 03/10/2025    HCT 46.0 03/10/2025    MCV 88 03/10/2025     03/10/2025 "       Office Urine Dip  No results found for this or any previous visit (from the past hour).

## 2025-03-15 NOTE — TELEPHONE ENCOUNTER
"FOLLOW UP: Yes    REASON FOR CONVERSATION: Flank Pain    SYMPTOMS: left flank pain    OTHER: Patient unsure if he will go to the Emergency Department. Patient took the Oxycodone but did not try Tylenol or Ibuprofen. Patient going to try those medications as well before going to the Emergency Department.     DISPOSITION: Go to ED Now    Reason for Disposition   [1] SEVERE pain (e.g., excruciating, scale 8-10) AND [2] not improved after pain medicine    Answer Assessment - Initial Assessment Questions  1. LOCATION: \"Where does it hurt?\" (e.g., left, right)      Left     2. ONSET: \"When did the pain start?\"      At 1600 today     3. SEVERITY: \"How bad is the pain?\" (e.g., Scale 1-10; mild, moderate, or severe)      8-9/10    4. PATTERN: \"Does the pain come and go, or is it constant?\"       Constant    5. CAUSE: \"What do you think is causing the pain?\"      Kidney stone    6. OTHER SYMPTOMS:  \"Do you have any other symptoms?\" (e.g., fever, abdomen pain, vomiting, leg weakness, burning with urination, blood in urine)    Feels like he has fever, did not check, feels hot and cold    Oxycodone 30 minutes ago    Protocols used: Flank Pain-Adult-    "

## 2025-03-17 ENCOUNTER — APPOINTMENT (OUTPATIENT)
Dept: LAB | Facility: CLINIC | Age: 35
End: 2025-03-17
Payer: COMMERCIAL

## 2025-03-17 ENCOUNTER — PREP FOR PROCEDURE (OUTPATIENT)
Dept: UROLOGY | Facility: AMBULATORY SURGERY CENTER | Age: 35
End: 2025-03-17

## 2025-03-17 DIAGNOSIS — R10.9 LEFT FLANK PAIN: ICD-10-CM

## 2025-03-17 DIAGNOSIS — N20.0 KIDNEY STONE: ICD-10-CM

## 2025-03-17 DIAGNOSIS — R39.89 SUSPECTED UTI: Primary | ICD-10-CM

## 2025-03-17 DIAGNOSIS — R39.89 SUSPECTED UTI: ICD-10-CM

## 2025-03-17 DIAGNOSIS — Z01.812 PRE-OPERATIVE LABORATORY EXAMINATION: ICD-10-CM

## 2025-03-17 PROCEDURE — 87086 URINE CULTURE/COLONY COUNT: CPT

## 2025-03-17 NOTE — TELEPHONE ENCOUNTER
Patient scheduled for left URS with Dr Prater on 3/20/2025.  Arlene ENGLAND spoke with patient today and confirmed  the date for surgery.

## 2025-03-17 NOTE — TELEPHONE ENCOUNTER
Spoke with patient and STEP PARENT - confirmed surgery date of 3/20  Type of surgery: #5  Operating physician:Dr. Prater  Location of surgery: [place] OR    Verbally went over prep with patient on 3/17  NPO  Bowel prep? No  Hospital calls afternoon prior with arrival time (calls Friday afternoon for Monday surgery)  Patient needs ride to and from surgery   outpatient  Pre-op testing to be done 2 weeks prior to surgery. All testing can be done as a walk-in. EKG can only be done as a walk-in at any [facility].  Labs needed: UC  Blood thinners:   None  Clearances needed: None    Emailed  ([email or web address]) to patient on 3/17  Copy of packet scanned into Media  Labs in packet and in electronic record   Soap prep in packet  nurse will call with post-op information

## 2025-03-17 NOTE — TELEPHONE ENCOUNTER
Patient's stepmother calling to speak to SS.     Informed her that the SS is waiting to hear back from the provider and will give her a call back at the end of the day.     Also informed her that the SS will be placing an order to get a urine culture done and that her can walk into any [facility]'s lab today.    They will be going to [facility] lab today.     She is requesting a call back at [phone]

## 2025-03-18 LAB — BACTERIA UR CULT: NORMAL

## 2025-03-18 NOTE — PRE-PROCEDURE INSTRUCTIONS
Pre-Surgery Instructions:   Medication Instructions    acetaminophen (TYLENOL) 325 mg tablet Uses PRN- OK to take day of surgery    ibuprofen (MOTRIN) 600 mg tablet Hold until after surgery    oxyCODONE (Roxicodone) 5 immediate release tablet Uses PRN- OK to take day of surgery    tamsulosin (FLOMAX) 0.4 mg Take day of surgery.      Medication instructions for day of surgery reviewed. Please take all instructed medications with only a sip of water.       You will receive a call one business day prior to surgery with an arrival time and hospital directions. If your surgery is scheduled on a Monday, the hospital will be calling you on the Friday prior to your surgery. If you have not heard from anyone by 8pm, please call the hospital supervisor through the hospital  at [phone]. ([place] [phone] or [place] [phone]).    Do not eat or drink anything after midnight the night before your surgery, including candy, mints, lifesavers, or chewing gum. Do not drink alcohol 24hrs before your surgery. Try not to smoke at least 24hrs before your surgery.       Follow the pre surgery showering instructions as listed in the “My Surgical Experience Booklet” or otherwise provided by your surgeon's office. Do not use a blade to shave the surgical area 1 week before surgery. It is okay to use a clean electric clippers up to 24 hours before surgery. Do not apply any lotions, creams, including makeup, cologne, deodorant, or perfumes after showering on the day of your surgery. Do not use dry shampoo, hair spray, hair gel, or any type of hair products.     No contact lenses, eye make-up, or artificial eyelashes. Remove nail polish, including gel polish, and any artificial, gel, or acrylic nails if possible. Remove all jewelry including rings and body piercing jewelry.     Wear causal clothing that is easy to take on and off. Consider your type of surgery.    Keep any valuables, jewelry, piercings at home. Please  bring any specially ordered equipment (sling, braces) if indicated.    Arrange for a responsible person to drive you to and from the hospital on the day of your surgery. Please confirm the visitor policy for the day of your procedure when you receive your phone call with an arrival time.     Call the surgeon's office with any new illnesses, exposures, or additional questions prior to surgery.    Please reference your “My Surgical Experience Booklet” for additional information to prepare for your upcoming surgery.

## 2025-03-19 ENCOUNTER — ANESTHESIA EVENT (OUTPATIENT)
Dept: PERIOP | Facility: HOSPITAL | Age: 35
End: 2025-03-19
Payer: COMMERCIAL

## 2025-03-20 ENCOUNTER — ANESTHESIA (OUTPATIENT)
Dept: PERIOP | Facility: HOSPITAL | Age: 35
End: 2025-03-20
Payer: COMMERCIAL

## 2025-03-20 ENCOUNTER — TELEPHONE (OUTPATIENT)
Dept: UROLOGY | Facility: AMBULATORY SURGERY CENTER | Age: 35
End: 2025-03-20

## 2025-03-20 ENCOUNTER — PREP FOR PROCEDURE (OUTPATIENT)
Dept: UROLOGY | Facility: AMBULATORY SURGERY CENTER | Age: 35
End: 2025-03-20

## 2025-03-20 ENCOUNTER — HOSPITAL ENCOUNTER (OUTPATIENT)
Facility: HOSPITAL | Age: 35
Setting detail: OUTPATIENT SURGERY
Discharge: HOME | End: 2025-03-20
Attending: UROLOGY | Admitting: UROLOGY
Payer: COMMERCIAL

## 2025-03-20 ENCOUNTER — APPOINTMENT (OUTPATIENT)
Dept: RADIOLOGY | Facility: HOSPITAL | Age: 35
End: 2025-03-20
Payer: COMMERCIAL

## 2025-03-20 VITALS
TEMPERATURE: 97.4 F | HEART RATE: 63 BPM | WEIGHT: 157 LBS | DIASTOLIC BLOOD PRESSURE: 77 MMHG | HEIGHT: 63 IN | RESPIRATION RATE: 13 BRPM | SYSTOLIC BLOOD PRESSURE: 142 MMHG | OXYGEN SATURATION: 97 % | BODY MASS INDEX: 27.82 KG/M2

## 2025-03-20 DIAGNOSIS — N20.1 URETERAL CALCULUS, LEFT: Primary | ICD-10-CM

## 2025-03-20 DIAGNOSIS — N13.2 HYDRONEPHROSIS WITH URINARY OBSTRUCTION DUE TO URETERAL CALCULUS: Primary | ICD-10-CM

## 2025-03-20 DIAGNOSIS — R10.9 LEFT FLANK PAIN: ICD-10-CM

## 2025-03-20 PROCEDURE — 52332 CYSTOSCOPY AND TREATMENT: CPT | Performed by: UROLOGY

## 2025-03-20 PROCEDURE — C1758 CATHETER, URETERAL: HCPCS | Performed by: UROLOGY

## 2025-03-20 PROCEDURE — 52351 CYSTOURETERO & OR PYELOSCOPE: CPT | Performed by: UROLOGY

## 2025-03-20 PROCEDURE — C1769 GUIDE WIRE: HCPCS | Performed by: UROLOGY

## 2025-03-20 PROCEDURE — 74420 UROGRAPHY RTRGR +-KUB: CPT

## 2025-03-20 PROCEDURE — C2625 STENT, NON-COR, TEM W/DEL SY: HCPCS | Performed by: UROLOGY

## 2025-03-20 DEVICE — INLAY OPTIMA URETERAL STENT W/O GUIDEWIRE
Type: IMPLANTABLE DEVICE | Site: URETER | Status: NON-FUNCTIONAL
Brand: BARD® INLAY OPTIMA® URETERAL STENT
Removed: 2025-03-30

## 2025-03-20 RX ORDER — OXYBUTYNIN CHLORIDE 5 MG/1
5 TABLET ORAL 3 TIMES DAILY PRN
Qty: 30 TABLET | Refills: 0 | Status: SHIPPED | OUTPATIENT
Start: 2025-03-20 | End: 2025-03-30

## 2025-03-20 RX ORDER — SODIUM CHLORIDE, SODIUM LACTATE, POTASSIUM CHLORIDE, CALCIUM CHLORIDE 600; 310; 30; 20 MG/100ML; MG/100ML; MG/100ML; MG/100ML
INJECTION, SOLUTION INTRAVENOUS CONTINUOUS PRN
Status: DISCONTINUED | OUTPATIENT
Start: 2025-03-20 | End: 2025-03-20

## 2025-03-20 RX ORDER — HYDROMORPHONE HCL IN WATER/PF 6 MG/30 ML
0.2 PATIENT CONTROLLED ANALGESIA SYRINGE INTRAVENOUS
Status: DISCONTINUED | OUTPATIENT
Start: 2025-03-20 | End: 2025-03-20 | Stop reason: HOSPADM

## 2025-03-20 RX ORDER — FENTANYL CITRATE 50 UG/ML
INJECTION, SOLUTION INTRAMUSCULAR; INTRAVENOUS AS NEEDED
Status: DISCONTINUED | OUTPATIENT
Start: 2025-03-20 | End: 2025-03-20

## 2025-03-20 RX ORDER — SODIUM CHLORIDE, SODIUM LACTATE, POTASSIUM CHLORIDE, CALCIUM CHLORIDE 600; 310; 30; 20 MG/100ML; MG/100ML; MG/100ML; MG/100ML
100 INJECTION, SOLUTION INTRAVENOUS CONTINUOUS
Status: DISCONTINUED | OUTPATIENT
Start: 2025-03-20 | End: 2025-03-20 | Stop reason: HOSPADM

## 2025-03-20 RX ORDER — FENTANYL CITRATE/PF 50 MCG/ML
50 SYRINGE (ML) INJECTION
Status: DISCONTINUED | OUTPATIENT
Start: 2025-03-20 | End: 2025-03-20 | Stop reason: HOSPADM

## 2025-03-20 RX ORDER — PHENAZOPYRIDINE HYDROCHLORIDE 200 MG/1
200 TABLET, FILM COATED ORAL
Qty: 6 TABLET | Refills: 0 | Status: SHIPPED | OUTPATIENT
Start: 2025-03-20 | End: 2025-03-22

## 2025-03-20 RX ORDER — MIDAZOLAM HYDROCHLORIDE 2 MG/2ML
INJECTION, SOLUTION INTRAMUSCULAR; INTRAVENOUS AS NEEDED
Status: DISCONTINUED | OUTPATIENT
Start: 2025-03-20 | End: 2025-03-20

## 2025-03-20 RX ORDER — ONDANSETRON 2 MG/ML
4 INJECTION INTRAMUSCULAR; INTRAVENOUS ONCE AS NEEDED
Status: DISCONTINUED | OUTPATIENT
Start: 2025-03-20 | End: 2025-03-20 | Stop reason: HOSPADM

## 2025-03-20 RX ORDER — SODIUM CHLORIDE, SODIUM LACTATE, POTASSIUM CHLORIDE, CALCIUM CHLORIDE 600; 310; 30; 20 MG/100ML; MG/100ML; MG/100ML; MG/100ML
50 INJECTION, SOLUTION INTRAVENOUS CONTINUOUS
Status: CANCELLED | OUTPATIENT
Start: 2025-03-20

## 2025-03-20 RX ORDER — CEFAZOLIN SODIUM 2 G/50ML
2000 SOLUTION INTRAVENOUS ONCE
Status: COMPLETED | OUTPATIENT
Start: 2025-03-20 | End: 2025-03-20

## 2025-03-20 RX ORDER — DEXAMETHASONE SODIUM PHOSPHATE 10 MG/ML
INJECTION, SOLUTION INTRAMUSCULAR; INTRAVENOUS AS NEEDED
Status: DISCONTINUED | OUTPATIENT
Start: 2025-03-20 | End: 2025-03-20

## 2025-03-20 RX ORDER — LIDOCAINE HYDROCHLORIDE 10 MG/ML
INJECTION, SOLUTION EPIDURAL; INFILTRATION; INTRACAUDAL; PERINEURAL AS NEEDED
Status: DISCONTINUED | OUTPATIENT
Start: 2025-03-20 | End: 2025-03-20

## 2025-03-20 RX ORDER — ONDANSETRON 2 MG/ML
INJECTION INTRAMUSCULAR; INTRAVENOUS AS NEEDED
Status: DISCONTINUED | OUTPATIENT
Start: 2025-03-20 | End: 2025-03-20

## 2025-03-20 RX ORDER — CEFAZOLIN SODIUM 2 G/50ML
2000 SOLUTION INTRAVENOUS ONCE
OUTPATIENT
Start: 2025-03-20 | End: 2025-03-20

## 2025-03-20 RX ORDER — PROPOFOL 10 MG/ML
INJECTION, EMULSION INTRAVENOUS AS NEEDED
Status: DISCONTINUED | OUTPATIENT
Start: 2025-03-20 | End: 2025-03-20

## 2025-03-20 RX ADMIN — LIDOCAINE HYDROCHLORIDE 50 MG: 10 INJECTION, SOLUTION EPIDURAL; INFILTRATION; INTRACAUDAL; PERINEURAL at 12:47

## 2025-03-20 RX ADMIN — FENTANYL CITRATE 25 MCG: 50 INJECTION INTRAMUSCULAR; INTRAVENOUS at 12:54

## 2025-03-20 RX ADMIN — PROPOFOL 200 MG: 10 INJECTION, EMULSION INTRAVENOUS at 12:47

## 2025-03-20 RX ADMIN — MIDAZOLAM 2 MG: 1 INJECTION INTRAMUSCULAR; INTRAVENOUS at 12:43

## 2025-03-20 RX ADMIN — CEFAZOLIN SODIUM 2000 MG: 2 SOLUTION INTRAVENOUS at 12:47

## 2025-03-20 RX ADMIN — FENTANYL CITRATE 25 MCG: 50 INJECTION INTRAMUSCULAR; INTRAVENOUS at 12:57

## 2025-03-20 RX ADMIN — ONDANSETRON 4 MG: 2 INJECTION INTRAMUSCULAR; INTRAVENOUS at 12:51

## 2025-03-20 RX ADMIN — DEXAMETHASONE SODIUM PHOSPHATE 10 MG: 10 INJECTION, SOLUTION INTRAMUSCULAR; INTRAVENOUS at 12:51

## 2025-03-20 RX ADMIN — SODIUM CHLORIDE, SODIUM LACTATE, POTASSIUM CHLORIDE, AND CALCIUM CHLORIDE: .6; .31; .03; .02 INJECTION, SOLUTION INTRAVENOUS at 12:33

## 2025-03-20 RX ADMIN — SODIUM CHLORIDE, SODIUM LACTATE, POTASSIUM CHLORIDE, AND CALCIUM CHLORIDE 100 ML/HR: .6; .31; .03; .02 INJECTION, SOLUTION INTRAVENOUS at 09:38

## 2025-03-20 RX ADMIN — FENTANYL CITRATE 25 MCG: 50 INJECTION INTRAMUSCULAR; INTRAVENOUS at 12:43

## 2025-03-20 NOTE — TELEPHONE ENCOUNTER
Attempted ureteroscopy for a painful mid ureteral stone but 3 cm up from the UVJ the ureter was tight and could not get semirigid or flexible uteroscope through the area.  Stent placed.  Will reattempt ureteroscopy in 2 to 6 weeks.

## 2025-03-20 NOTE — ANESTHESIA POSTPROCEDURE EVALUATION
Post-Op Assessment Note    CV Status:  Stable    Pain management: adequate       Mental Status:  Alert and awake   Hydration Status:  Euvolemic   PONV Controlled:  Controlled   Airway Patency:  Patent     Post Op Vitals Reviewed: Yes    No anethesia notable event occurred.    Staff: Anesthesiologist           Last Filed PACU Vitals:  Vitals Value Taken Time   Temp 97.2 °F (36.2 °C) 03/20/25 1332   Pulse 66 03/20/25 1332   /103 03/20/25 1332   Resp 16 03/20/25 1332   SpO2 96 % 03/20/25 1332       Modified Nupur:     Vitals Value Taken Time   Activity 2 03/20/25 1355   Respiration 2 03/20/25 1355   Circulation 2 03/20/25 1355   Consciousness 2 03/20/25 1355   Oxygen Saturation 2 03/20/25 1355     Modified Nupur Score: 10

## 2025-03-20 NOTE — OP NOTE
OPERATIVE REPORT  PATIENT NAME: Sreedhar Grissom    :  1990  MRN: [id number]  Pt Location: BE CYSTO ROOM 01    SURGERY DATE: 3/20/2025    Surgeons and Role:     * Mathew Prater MD - Primary    Preop Diagnosis:  Left flank pain [R10.9]  Hydronephrosis with urinary obstruction due to ureteral calculus [N13.2]    Post-Op Diagnosis Codes:     * Left flank pain [R10.9]     * Hydronephrosis with urinary obstruction due to ureteral calculus [N13.2]    Procedure(s):  Cystoscopy  Left  ABORTED URETEROSCOPY.   RETROGRADE PYELOGRAM AND INSERTION STENT URETERAL    Specimen(s):  * No specimens in log *    Estimated Blood Loss:   2 mL    Drains:  * No LDAs found *    Anesthesia Type:   General    Operative Indications:  Patient with a 7 mm mid ureteral stone with significant pain issues FasTRACT for surgery.    Operative Findings:  Tight area in the distal ureter about 3 cm from UVJ which could not be navigated with a semirigid ureteroscope nor 5.3 Eritrean flexible ureteroscope and therefore ureteroscopy aborted  Stent placed      Complications:   None    Procedure and Technique:    After informed consent including the risks of bleeding, infection, ureteral injury, and need for secondary procedures, patient was placed supine in the operating room theater.  Gen. anesthesia was administered.      They were then placed in the dorsal lithotomy position and sterilely prepped and draped in usual fashion. Antibiotic prophylaxis and DVT prophylaxis were administered Cystoscopy was performed the 21 Eritrean cystoscope 30° lens.  This revealed a normal urethra.  Inspection of the bladder revealed no abnormalities.  Fluoroscopy did show evidence of a stone in the mid left ureter.    Attention was turned to the left ureteral orifice. A 5fr open ended catheter was attempted to be passed into the ureteral orifice. A retrograde ureteropyelogram was performed showing a filling defect in the mid ureter consistent with stone. Then a 0.38 solo  guidewire was passed through the open ended catheter and up the ureter into the renal pelvis. The scope was removed and a semirigid ureteroscope was introduced into ureteral orifice with the aid of a second wire.  The distal ureter was slightly tight but able to advance until 3 cm up where there was a very tight area which we could not safely advance the scope through even with the aid of a second wire.      Semirigid ureteroscope was removed and we attempted to pass a dual-lumen over a wire but met resistance in the same area and could not pass through.  Therefore we performed a retrograde which showed tapering for a segment of approximately 2 cm in this area.    We then attempted to pass a 5.3 Danish flexible ureteroscope over one of the 2 wires but again met resistance in the same area of the ureteroscope buckling.  At this point it was determined that abort further times uteroscopy place a stent to facilitate dilation of the ureter and another attempt at ureteroscopy in the future.      Cystoscope was reassembled over the guidewire and a 6 x 24 double-J stent inserted without difficulty with chelsey's hook coil seen in left collecting system on fluoroscopy and visually in the bladder.  The string was not left on. The bladder was drained.   The patient was placed back supine, awakened from general anesthesia and brought to recovery room in stable condition.     A qualified resident physician was not available.    Patient Disposition:  PACU     Plan: Reattempt ureteroscopy in 2 to 6 weeks after giving time for ureteral stent to dilate the ureter.             SIGNATURE: Mathew Prater MD  DATE: March 20, 2025  TIME: 1:38 PM

## 2025-03-20 NOTE — DISCHARGE INSTRUCTIONS
Sr. Grissom  Desafortunadamente, el uréter (tubo renal) estaba muy apretado y no dejaba que el endoscopio llegara hasta el lugar donde está el cálculo. Probamos 2 visores diferentes, así lula algunas otras técnicas. Por lo tanto, lo más seguro era colocar un stent y anastasia debería dilatar el uréter con el tiempo. Intentaremos realizar la cirugía de nuevo en 2 a 6 semanas, momento en el que el endoscopio debería ser capaz de pasar por el uréter sin tanta dificultad.    Organizaremos esta cirugía ambulatoria.    Le proporcionaremos medicamentos para ayudarlo a tolerar mcfarlane stent.    [place] sofi medicamentos según lo recetado con precaución para mayor comodidad. Lo más importante es que tete de 6 a 8 vasos de agua por día    Llame si tiene alguna pregunta o inquietud.    Dr. Mathew Prater  [facility] [place]  [phone]

## 2025-03-20 NOTE — ANESTHESIA PREPROCEDURE EVALUATION
Procedure:  CYSTOSCOPY URETEROSCOPY WITH LITHOTRIPSY HOLMIUM LASER, RETROGRADE PYELOGRAM AND INSERTION STENT URETERAL (Left: Bladder)    Relevant Problems   ANESTHESIA (within normal limits)      CARDIO (within normal limits)      ENDO (within normal limits)      GI/HEPATIC (within normal limits)      /RENAL (within normal limits)      GYN (within normal limits)      HEMATOLOGY (within normal limits)      MUSCULOSKELETAL (within normal limits)      NEURO/PSYCH (within normal limits)      PULMONARY (within normal limits)        Physical Exam    Airway    Mallampati score: II  TM Distance: >3 FB  Neck ROM: full     Dental   No notable dental hx     Cardiovascular  Cardiovascular exam normal    Pulmonary  Pulmonary exam normal     Other Findings        Anesthesia Plan  ASA Score- 1     Anesthesia Type- general with ASA Monitors.         Additional Monitors:     Airway Plan: LMA.           Plan Factors-Exercise tolerance (METS): >4 METS.    Chart reviewed.   Existing labs reviewed. Patient summary reviewed.    Patient is not a current smoker.              Induction- intravenous.    Postoperative Plan- Plan for postoperative opioid use. Planned trial extubation    Perioperative Resuscitation Plan - Level 1 - Full Code.       Informed Consent- Anesthetic plan and risks discussed with patient.        NPO Status:  Vitals Value Taken Time   Date of last liquid 03/20/25 03/20/25 0902   Time of last liquid 0750 03/20/25 0902   Date of last solid 03/19/25 03/20/25 0902   Time of last solid 1900 03/20/25 0902     Discussed General Anesthesia with patient including but not limited to risk of cardiac insult, pulmonary complication, stroke, reaction to medications and death. All questions answered and consent was obtained.      Japanese translation provided by family member per patient request.

## 2025-03-20 NOTE — INTERVAL H&P NOTE
H&P reviewed. After examining the patient I find no changes in the patients condition since the H&P had been written.    Vitals:    03/20/25 0901   BP: 154/93   Pulse: 68   Resp: 18   Temp: (!) 97 °F (36.1 °C)   SpO2: 99%     Plan for left ureteroscopy.  Consent previously obtained.  Urine culture negative.

## 2025-03-20 NOTE — DISCHARGE INSTR - AVS FIRST PAGE
Mr. Grissom  Unfortunately your ureter (kidney tube) was very tight and would not let the scope pass up to where the stone is.  We tried 2 different scopes as well as some other techniques.  Therefore the safest thing to do was to place a stent and this should dilate the ureter over time.  We will attempt to perform the surgery again in 2 to 6 weeks at which point the scope should be able to pass up the ureter without as much difficulty.    We will arrange this outpatient surgery.    We will provide to medications to help you tolerate your stent.    Please take your medications as prescribed with caution for comfort.  Most importantly please drink 6-8 glasses of water per day    Please call with any questions or concerns.    Mathew Prater MD  [facility]  [phone]      WHAT IS A STENT?   A stent is a thin, flexible piece of plastic that will hold open your ureter while the remaining small pieces of stone pass. This allows your kidney to drain easily and prevents you from having to “pass” these small stone pieces on your own, which could be painful. The stent is about 12 inches long and looks and feels like a thin piece of spaghetti.    How can I relieve ureteral stent pain?  Ureteral stents can be uncomfortable. You may have pelvic pain or a pulling sensation when you pee. Over-the-counter pain relievers such as nonsteroidal anti-inflammatory drugs (NSAIDs) can help.    Do I need to restrict activities while I have a ureteral stent?  You may need to restrict physical activities for the first week after the procedure. If your job doesn’t require lifting heavy objects, you should be able to return to work as usual within 24 hours after the procedure.    Your provider may recommend not having sex for the first week after stent placement to reduce the risk of a UTI.    What’s the prognosis for someone who has ureteral stents?  Ureteral stents are generally safe. They don’t typically cause any  long-term problems.    Despite the risk of annoying side effects, ureteral stents are helpful. Ureteral stents often allow kidney stones to pass. They also work well to resolve ureteral obstructions. Left untreated, a ureteral obstruction can lead to life-threatening kidney failure and sepsis.    WHEN TO CALL THE DOCTOR  When should I call the doctor?  You should call your healthcare provider if you experience:    Clots of tissue in urine.  Dark, cloudy or foul-smelling urine.  Difficulty urinating or extreme pain or burning sensation when peeing.  Kidney pain (dull ache deep on the left or right side of the spine).  Nausea and vomiting.  Signs of infection, such as fever or chills.    ADDITIONAL DETAILS  What is a kidney stent?  Rarely, a healthcare provider can’t place a ureteral stent due to scarring or other problems. You may need a nephrostomy (kidney stent) instead. To perform nephrostomy, a radiologist inserts a stent (tube) directly into a kidney. The kidney stent drains urine from the kidney into a bag outside of the body, bypassing the ureters and bladder.

## 2025-03-21 NOTE — TELEPHONE ENCOUNTER
Patient is calling today to ask for a return to work note. Patient plans to return to work on Monday 3/24.     Patient asks if it can be emailed to his step mother's email?    [email or web address]    Please review.    Call back [phone]

## 2025-03-21 NOTE — TELEPHONE ENCOUNTER
Note provided as per patient request to be sent to [email or web address]. Sent 3/21/2025. Patient verbally confirmed he received the Work Note.

## 2025-03-21 NOTE — TELEPHONE ENCOUNTER
KIM in Telugu to contact office. If he calls back, please ask how he is feeling after procedure. Any questions or concerns? Please inform SS will reach out possibly next week to schedule him for surgery.

## 2025-03-27 ENCOUNTER — APPOINTMENT (EMERGENCY)
Dept: RADIOLOGY | Facility: HOSPITAL | Age: 35
DRG: 445 | End: 2025-03-27
Payer: COMMERCIAL

## 2025-03-27 ENCOUNTER — HOSPITAL ENCOUNTER (INPATIENT)
Facility: HOSPITAL | Age: 35
LOS: 1 days | Discharge: HOME | DRG: 445 | End: 2025-03-30
Attending: EMERGENCY MEDICINE | Admitting: UROLOGY
Payer: COMMERCIAL

## 2025-03-27 DIAGNOSIS — R31.9 HEMATURIA: Primary | ICD-10-CM

## 2025-03-27 DIAGNOSIS — N20.1 LEFT URETERAL STONE: ICD-10-CM

## 2025-03-27 LAB
ANION GAP SERPL CALCULATED.3IONS-SCNC: 10 MMOL/L (ref 4–13)
BACTERIA UR QL AUTO: ABNORMAL /HPF
BILIRUB UR QL STRIP: ABNORMAL
BUN SERPL-MCNC: 18 MG/DL (ref 5–25)
CALCIUM SERPL-MCNC: 9.5 MG/DL (ref 8.4–10.2)
CHLORIDE SERPL-SCNC: 104 MMOL/L (ref 96–108)
CLARITY UR: ABNORMAL
CO2 SERPL-SCNC: 25 MMOL/L (ref 21–32)
COLOR UR: ABNORMAL
CREAT SERPL-MCNC: 0.87 MG/DL (ref 0.6–1.3)
ERYTHROCYTE [DISTWIDTH] IN BLOOD BY AUTOMATED COUNT: 12.2 % (ref 11.6–15.1)
GFR SERPL CREATININE-BSD FRML MDRD: 113 ML/MIN/1.73SQ M
GLUCOSE SERPL-MCNC: 98 MG/DL (ref 65–140)
GLUCOSE UR STRIP-MCNC: ABNORMAL MG/DL
HCT VFR BLD AUTO: 41.6 % (ref 36.5–49.3)
HGB BLD-MCNC: 14.1 G/DL (ref 12–17)
HGB UR QL STRIP.AUTO: ABNORMAL
KETONES UR STRIP-MCNC: ABNORMAL MG/DL
LEUKOCYTE ESTERASE UR QL STRIP: ABNORMAL
MCH RBC QN AUTO: 30.1 PG (ref 26.8–34.3)
MCHC RBC AUTO-ENTMCNC: 33.9 G/DL (ref 31.4–37.4)
MCV RBC AUTO: 89 FL (ref 82–98)
NITRITE UR QL STRIP: ABNORMAL
NON-SQ EPI CELLS URNS QL MICRO: ABNORMAL /HPF
PH UR STRIP.AUTO: ABNORMAL [PH]
PLATELET # BLD AUTO: 378 THOUSANDS/UL (ref 149–390)
PMV BLD AUTO: 8.9 FL (ref 8.9–12.7)
POTASSIUM SERPL-SCNC: 3.9 MMOL/L (ref 3.5–5.3)
PROT UR STRIP-MCNC: ABNORMAL MG/DL
RBC # BLD AUTO: 4.68 MILLION/UL (ref 3.88–5.62)
RBC #/AREA URNS AUTO: ABNORMAL /HPF
SODIUM SERPL-SCNC: 139 MMOL/L (ref 135–147)
SP GR UR STRIP.AUTO: 1.03 (ref 1–1.03)
WBC # BLD AUTO: 11.26 THOUSAND/UL (ref 4.31–10.16)
WBC #/AREA URNS AUTO: ABNORMAL /HPF

## 2025-03-27 PROCEDURE — 74018 RADEX ABDOMEN 1 VIEW: CPT

## 2025-03-27 PROCEDURE — 96374 THER/PROPH/DIAG INJ IV PUSH: CPT

## 2025-03-27 PROCEDURE — 81001 URINALYSIS AUTO W/SCOPE: CPT | Performed by: EMERGENCY MEDICINE

## 2025-03-27 PROCEDURE — 99284 EMERGENCY DEPT VISIT MOD MDM: CPT

## 2025-03-27 PROCEDURE — 36415 COLL VENOUS BLD VENIPUNCTURE: CPT | Performed by: EMERGENCY MEDICINE

## 2025-03-27 PROCEDURE — 87086 URINE CULTURE/COLONY COUNT: CPT | Performed by: EMERGENCY MEDICINE

## 2025-03-27 PROCEDURE — 80048 BASIC METABOLIC PNL TOTAL CA: CPT | Performed by: EMERGENCY MEDICINE

## 2025-03-27 PROCEDURE — 51798 US URINE CAPACITY MEASURE: CPT

## 2025-03-27 PROCEDURE — 99214 OFFICE O/P EST MOD 30 MIN: CPT | Performed by: UROLOGY

## 2025-03-27 PROCEDURE — 74176 CT ABD & PELVIS W/O CONTRAST: CPT

## 2025-03-27 PROCEDURE — 85027 COMPLETE CBC AUTOMATED: CPT | Performed by: EMERGENCY MEDICINE

## 2025-03-27 RX ORDER — ACETAMINOPHEN 325 MG/1
650 TABLET ORAL EVERY 6 HOURS PRN
Status: DISCONTINUED | OUTPATIENT
Start: 2025-03-27 | End: 2025-03-30 | Stop reason: HOSPADM

## 2025-03-27 RX ORDER — CEFAZOLIN SODIUM 2 G/50ML
2000 SOLUTION INTRAVENOUS EVERY 8 HOURS
Status: DISCONTINUED | OUTPATIENT
Start: 2025-03-27 | End: 2025-03-30

## 2025-03-27 RX ORDER — IBUPROFEN 600 MG/1
600 TABLET, FILM COATED ORAL EVERY 6 HOURS PRN
Status: DISCONTINUED | OUTPATIENT
Start: 2025-03-27 | End: 2025-03-27

## 2025-03-27 RX ORDER — OXYBUTYNIN CHLORIDE 5 MG/1
5 TABLET ORAL 3 TIMES DAILY PRN
Status: DISCONTINUED | OUTPATIENT
Start: 2025-03-27 | End: 2025-03-28

## 2025-03-27 RX ORDER — KETOROLAC TROMETHAMINE 30 MG/ML
15 INJECTION, SOLUTION INTRAMUSCULAR; INTRAVENOUS EVERY 6 HOURS PRN
Status: DISPENSED | OUTPATIENT
Start: 2025-03-27 | End: 2025-03-29

## 2025-03-27 RX ORDER — KETOROLAC TROMETHAMINE 30 MG/ML
15 INJECTION, SOLUTION INTRAMUSCULAR; INTRAVENOUS ONCE
Status: COMPLETED | OUTPATIENT
Start: 2025-03-27 | End: 2025-03-27

## 2025-03-27 RX ORDER — MAGNESIUM HYDROXIDE 1200 MG/15ML
3000 LIQUID ORAL CONTINUOUS
Status: DISCONTINUED | OUTPATIENT
Start: 2025-03-27 | End: 2025-03-30 | Stop reason: HOSPADM

## 2025-03-27 RX ORDER — SODIUM CHLORIDE 9 MG/ML
125 INJECTION, SOLUTION INTRAVENOUS CONTINUOUS
Status: DISCONTINUED | OUTPATIENT
Start: 2025-03-28 | End: 2025-03-29

## 2025-03-27 RX ORDER — TAMSULOSIN HYDROCHLORIDE 0.4 MG/1
0.4 CAPSULE ORAL
Status: DISCONTINUED | OUTPATIENT
Start: 2025-03-28 | End: 2025-03-30 | Stop reason: HOSPADM

## 2025-03-27 RX ORDER — OXYCODONE HYDROCHLORIDE 5 MG/1
5 TABLET ORAL EVERY 4 HOURS PRN
Refills: 0 | Status: DISCONTINUED | OUTPATIENT
Start: 2025-03-27 | End: 2025-03-30 | Stop reason: HOSPADM

## 2025-03-27 RX ORDER — DOCUSATE SODIUM 100 MG/1
100 CAPSULE, LIQUID FILLED ORAL 2 TIMES DAILY
Status: DISCONTINUED | OUTPATIENT
Start: 2025-03-27 | End: 2025-03-30 | Stop reason: HOSPADM

## 2025-03-27 RX ADMIN — SODIUM CHLORIDE FOR IRRIGATION 3000 ML: 0.9 SOLUTION IRRIGATION at 20:47

## 2025-03-27 RX ADMIN — KETOROLAC TROMETHAMINE 15 MG: 30 INJECTION, SOLUTION INTRAMUSCULAR; INTRAVENOUS at 10:17

## 2025-03-27 RX ADMIN — CEFAZOLIN SODIUM 2000 MG: 2 SOLUTION INTRAVENOUS at 21:44

## 2025-03-27 RX ADMIN — SODIUM CHLORIDE FOR IRRIGATION 3000 ML: 0.9 SOLUTION IRRIGATION at 22:36

## 2025-03-27 RX ADMIN — DOCUSATE SODIUM 100 MG: 100 CAPSULE, LIQUID FILLED ORAL at 21:44

## 2025-03-27 RX ADMIN — SODIUM CHLORIDE 125 ML/HR: 0.9 INJECTION, SOLUTION INTRAVENOUS at 23:38

## 2025-03-27 NOTE — TELEPHONE ENCOUNTER
Patient warmed transferred to [facility].      used; Blenda.    Patient has unable to urinate since yesterday at 3:30pm, at 7pm had a couple drops of blood, and left side flank pain. Since being unable to urinate only sipping water at this time due to discomfort and pain.     Patient is s/p 3/20/25 Procedure: CYSTOSCOPY, ABORTED URETEROSCOPY, RETROGRADE PYELOGRAM AND INSERTION STENT URETERAL (Left: Bladder)  with Dr. Prater      Patient advised to go to ED, and will go now to [facility].

## 2025-03-27 NOTE — ED PROVIDER NOTES
Emergency Department Note- Sreedhar Grissom 34 y.o. male MRN: [id number]    Unit/Bed#: ED 02 Encounter: [id number]      Final Diagnoses:     1. Hematuria    2. Left ureteral stone      ED Course as of 03/27/25 1541   Thu Mar 27, 2025   1031 Postvoid residual bladder scan was 450 mL of urine   1159 Three-way Reyna catheter inserted, manual evacuation with syringe performed, no blood clots obtained, then continuous bladder irrigation initiated, I assessed the patient after 1500 mL of continuous bladder irrigation, urine was draining somewhat pink-tinged, no clots visible, said he is feeling better    1341 Patient reassessed, feeling comfortable, continuous bladder irrigation still going on, still draining pink Sanket-Aid colored urine, no clots   1412 Patient reassessed after 6 L continuous bladder irrigation performed, still having pink Sanket-Aid colored urine, no clots.   1456 Urology consulted   1539 Urology RANDY Osborne requesting CT stone protocol to eval for bladder clots prior to urology assessment.         HPI:   Patient is a 34-year-old male with a history of kidney stones, accompanied by his mother-in-law.  The patient is predominantly Syriac-speaking but indicates he prefer to have his bilingual mother-in-law serve as a .      Patient was seen March 10, 2025 at the Emergency Department, found to have a left mid ureteral 7 mm stone, urinalysis showed no evidence of infection, discharged home with outpatient follow-up.  Urine culture had no growth.  The patient was then followed up with urology and had outpatient cystoscopy performed, they were unable to retrieve the stone so a ureteral stent was placed.  Recommendations were for reattempt at ureteroscopy in 2 to 6 weeks.    Patient says he was doing okay until yesterday he began having some hematuria, tried to pass a few clots and feel like he is not emptying his bladder all the way.  He feels like he has pressure and discomfort in his bladder.  No  fever, no chills, does not have the type of pain that he had with his prior kidney stone.  His pain is mild in severity, has been using some Tylenol without significant relief to it.  No upper abdominal pain, no vomiting, no flank pain.  Patient says that he is post to receive a phone call on either Monday or Tuesday, 5 days from now from urology to schedule his follow-up cystoscopy.      MEDICAL DECISION MAKING   Patient presents with continual hematuria after 6 L continuous bladder irrigation.  Laboratory studies showed no evidence of renal insufficiency or life-threatening anemia.  KUB shows stent appears to be properly positioned.  Plan is for CT stone study to evaluate for bladder clots, and urology consultation. Signed out to Dr. Boateng    COLLECTION AND INTERPRETATION OF DATA  I reviewed prior external notes, including March 20, 2025 cystoscopy note    I ordered each unique test  Tests reviewed personally by me:  Labs: see above  Imaging: I independently interpreted the KUB x-ray as noted.      Physical:     Vitals:    03/27/25 1200 03/27/25 1300 03/27/25 1400 03/27/25 1500   BP: 134/84 124/75 135/84 131/81   BP Location:       Pulse: 65 65 71 67   Resp:  18     Temp:       TempSrc:       SpO2: 98% 98% 98% 98%       General:  Patient is well-appearing  Head:  Atraumatic  Eyes:  Conjunctiva pink  ENT:  Mucous membranes are moist  Neck:  Supple  Cardiac:  S1-S2, without murmurs  Lungs:  Clear to auscultation bilaterally  Abdomen:  Soft, nontender, normal bowel sounds, no CVA tenderness, no tympany, no rigidity, no guarding  Extremities:  Normal range of motion  Neurologic:  Awake, fluent speech, normal comprehension. AAOx3.   Skin:  Pink warm and dry  Psychiatric:  Alert, pleasant, cooperative            Critical Care Time:   Procedures      Past Medical:    has no past medical history on file.    Past Surgical:    has a past surgical history that includes Gum surgery; pr dbrdmt fx&/dislc subq t/m/f bone (Left,  10/29/2019); pr dbrdmt w/rmvl fm fx&/dislc skin&subq tissus (Left, 10/29/2019); [place] retrograde pyelogram (3/20/2025); and pr cysto/uretero w/lithotripsy &indwell stent insrt (Left, 3/20/2025).    Social:     Social History     Substance and Sexual Activity   Alcohol Use Not Currently     Social History     Tobacco Use   Smoking Status Never   Smokeless Tobacco Never     Social History     Substance and Sexual Activity   Drug Use Never       Outpatient Medications:     No current facility-administered medications on file prior to encounter.     Current Outpatient Medications on File Prior to Encounter   Medication Sig Dispense Refill    acetaminophen (TYLENOL) 325 mg tablet Take 2 tablets (650 mg total) by mouth every 6 (six) hours as needed (pain) 30 tablet 0    ibuprofen (MOTRIN) 600 mg tablet Take 1 tablet (600 mg total) by mouth every 6 (six) hours as needed (pain) 30 tablet 0    ondansetron (ZOFRAN) 4 mg tablet Take 1 tablet (4 mg total) by mouth every 6 (six) hours (Patient not taking: Reported on 3/18/2025) 12 tablet 0    oxybutynin (DITROPAN) 5 mg tablet Take 1 tablet (5 mg total) by mouth 3 (three) times a day as needed (bladder spasms) for up to 10 days 30 tablet 0    oxyCODONE (Roxicodone) 5 immediate release tablet Take 1 tablet (5 mg total) by mouth every 4 (four) hours as needed for severe pain Max Daily Amount: 30 mg 10 tablet 0    tamsulosin (FLOMAX) 0.4 mg Take 1 capsule (0.4 mg total) by mouth daily with dinner for 10 days 10 capsule 0     Prior to Admission Medications   Prescriptions Last Dose Informant Patient Reported? Taking?   acetaminophen (TYLENOL) 325 mg tablet   No No   Sig: Take 2 tablets (650 mg total) by mouth every 6 (six) hours as needed (pain)   ibuprofen (MOTRIN) 600 mg tablet   No No   Sig: Take 1 tablet (600 mg total) by mouth every 6 (six) hours as needed (pain)   ondansetron (ZOFRAN) 4 mg tablet  Self No No   Sig: Take 1 tablet (4 mg total) by mouth every 6 (six) hours   Patient  not taking: Reported on 3/18/2025   oxyCODONE (Roxicodone) 5 immediate release tablet   No No   Sig: Take 1 tablet (5 mg total) by mouth every 4 (four) hours as needed for severe pain Max Daily Amount: 30 mg   oxybutynin (DITROPAN) 5 mg tablet   No No   Sig: Take 1 tablet (5 mg total) by mouth 3 (three) times a day as needed (bladder spasms) for up to 10 days   tamsulosin (FLOMAX) 0.4 mg   No No   Sig: Take 1 capsule (0.4 mg total) by mouth daily with dinner for 10 days      Facility-Administered Medications: None           Medications   ketorolac (TORADOL) injection 15 mg (15 mg Intravenous Given 3/27/25 1017)     XR abdomen 1 view kub   ED Interpretation   KUB x-ray interpreted by me shows left-sided ureteral stent, appears to be properly positioned      Final Result      No radiopaque urinary tract calculi.            Workstation performed: [id number]         CT renal stone study abdomen pelvis wo contrast    (Results Pending)     Orders Placed This Encounter   Procedures    Urine culture    XR abdomen 1 view kub    CT renal stone study abdomen pelvis wo contrast    Basic metabolic panel    CBC    Urinalysis with microscopic    Bladder scan    Insert peripheral IV    Bladder irrigation    Insert/Maintain Urinary Catheter    Bladder scan    Inpatient consult to Urology     Labs Reviewed   CBC AND PLATELET - Abnormal       Result Value Ref Range Status    WBC 11.26 (*) 4.31 - 10.16 Thousand/uL Final    RBC 4.68  3.88 - 5.62 Million/uL Final    Hemoglobin 14.1  12.0 - 17.0 g/dL Final    Hematocrit 41.6  36.5 - 49.3 % Final    MCV 89  82 - 98 fL Final    MCH 30.1  26.8 - 34.3 pg Final    MCHC 33.9  31.4 - 37.4 g/dL Final    RDW 12.2  11.6 - 15.1 % Final    Platelets 378  149 - 390 Thousands/uL Final    MPV 8.9  8.9 - 12.7 fL Final   URINALYSIS WITH MICROSCOPIC - Abnormal    Color, UA Red   Final    Clarity, UA Turbid   Final    Specific Gravity, UA 1.026  1.003 - 1.030 Final    pH, UA Interference-unable to analyze  (*) 4.5, 5.0, 5.5, 6.0, 6.5, 7.0, 7.5, 8.0 Final    Leukocytes, UA Interference- unable to analyze (*) Negative Final    Nitrite, UA Interference- unable to analyze  Negative Final    Protein, UA Interference- unable to analyze (*) Negative mg/dl Final    Glucose, UA Interference- unable to analyze (*) Negative mg/dl Final    Ketones, UA Interference- unable to analyze (*) Negative mg/dl Final    Bilirubin, UA Interference- unable to analyze (*) Negative Final    Occult Blood, UA Large (*) Negative Final    RBC, UA Innumerable (*) None Seen, 1-2 /hpf Final    WBC, UA Field obscured, unable to enumerate (*) None Seen, 1-2 /hpf Final    Epithelial Cells Field obscured, unable to enumerate (*) None Seen, Occasional /hpf Final    Bacteria, UA Field obscured, unable to enumerate (*) None Seen, Occasional /hpf Final   URINE CULTURE   BASIC METABOLIC PANEL    Sodium 139  135 - 147 mmol/L Final    Potassium 3.9  3.5 - 5.3 mmol/L Final    Chloride 104  96 - 108 mmol/L Final    CO2 25  21 - 32 mmol/L Final    ANION GAP 10  4 - 13 mmol/L Final    BUN 18  5 - 25 mg/dL Final    Creatinine 0.87  0.60 - 1.30 mg/dL Final    Comment: Standardized to IDMS reference method    Glucose 98  65 - 140 mg/dL Final    Comment: If the patient is fasting, the ADA then defines impaired fasting glucose as > 100 mg/dL and diabetes as > or equal to 123 mg/dL.    Calcium 9.5  8.4 - 10.2 mg/dL Final    eGFR 113  ml/min/1.73sq m Final    Narrative:     National Kidney Disease Foundation guidelines for Chronic Kidney Disease (CKD):                     Stage 1 with normal or high GFR (GFR > 90 mL/min/1.73 square meters)                    Stage 2 Mild CKD (GFR = 60-89 mL/min/1.73 square meters)                    Stage 3A Moderate CKD (GFR = 45-59 mL/min/1.73 square meters)                    Stage 3B Moderate CKD (GFR = 30-44 mL/min/1.73 square meters)                    Stage 4 Severe CKD (GFR = 15-29 mL/min/1.73 square meters)                     Stage 5 End Stage CKD (GFR <15 mL/min/1.73 square meters)                  Note: GFR calculation is accurate only with a steady state creatinine     Time reflects when diagnosis was documented in both MDM as applicable and the Disposition within this note       Time User Action Codes Description Comment    3/27/2025  2:55 PM Darrick Ariza [R31.9] Hematuria     3/27/2025  3:39 PM Darrick Ariza [N20.1] Left ureteral stone           ED Disposition       None          Follow-up Information    None       Patient's Medications   Discharge Prescriptions    No medications on file     No discharge procedures on file.                       Electronically signed by:  GENO Bravo DO  03/27/25 7394

## 2025-03-27 NOTE — TREATMENT PLAN
Received secure chat message about patient presented emergency department with gross hematuria and inability urinate.  He was bladder scanned for 450 mL of urine this morning.  However three-way 18 Haitian Reyna catheter was placed around 11:13 AM for 1 L of urine.  ER unable to irrigate any clots through 18 Haitian catheter.  CBI was initiated.  He received 6 L of CBI and his urine remained a Sanket-Aid color without any clots seen.    He was recently dx with 7mm L ureteral stone, no evidence of infection, urine cx no growth. Had outpatient cystoscopy with stent placement on 3/20 by Dr. Prater, unable to perform ureteroscopy secondary to tight ureter with inability to get semirigid or flexible ureteroscope through the area.  Planning for definitive stone management in 2-6 weeks.     ER reports he was doing well after procedure until yesterday when developed hematuria and urinary retention, passing few clots and very little urine. Slight suprpubic pressure but no flank pain.    He is afebrile, with stable vital signs   no CVA tenderness  WBC 11.26  H&H 14.1/41.6  Creat 0.87  KUB with stent in appropriate position    Discussed with Dr. Tompkins.  Recommend CT of abdomen and pelvis to evaluate for clot burden

## 2025-03-27 NOTE — TREATMENT PLAN
CT resulted: No hydronephrosis.  4 mm density along the course of the left ureter likely the left ureteral calculus previously visualized.  Stent is in appropriate position.    Emergency department sent picture of urine which is dark cherry color.    Plan:  Admit to urology  CBI  Manual irrigation  Bladder scans  A.m. labs  N.p.o. at midnight for reevaluation in a.m.

## 2025-03-28 LAB
ANION GAP SERPL CALCULATED.3IONS-SCNC: 6 MMOL/L (ref 4–13)
BACTERIA UR CULT: NORMAL
BASOPHILS # BLD AUTO: 0.02 THOUSANDS/ÂΜL (ref 0–0.1)
BASOPHILS NFR BLD AUTO: 0 % (ref 0–1)
BUN SERPL-MCNC: 14 MG/DL (ref 5–25)
CALCIUM SERPL-MCNC: 8.7 MG/DL (ref 8.4–10.2)
CHLORIDE SERPL-SCNC: 106 MMOL/L (ref 96–108)
CO2 SERPL-SCNC: 28 MMOL/L (ref 21–32)
CREAT SERPL-MCNC: 0.99 MG/DL (ref 0.6–1.3)
EOSINOPHIL # BLD AUTO: 0.09 THOUSAND/ÂΜL (ref 0–0.61)
EOSINOPHIL NFR BLD AUTO: 1 % (ref 0–6)
ERYTHROCYTE [DISTWIDTH] IN BLOOD BY AUTOMATED COUNT: 12.1 % (ref 11.6–15.1)
GFR SERPL CREATININE-BSD FRML MDRD: 99 ML/MIN/1.73SQ M
GLUCOSE SERPL-MCNC: 95 MG/DL (ref 65–140)
HCT VFR BLD AUTO: 37.7 % (ref 36.5–49.3)
HGB BLD-MCNC: 12.2 G/DL (ref 12–17)
IMM GRANULOCYTES # BLD AUTO: 0.05 THOUSAND/UL (ref 0–0.2)
IMM GRANULOCYTES NFR BLD AUTO: 1 % (ref 0–2)
LYMPHOCYTES # BLD AUTO: 1.94 THOUSANDS/ÂΜL (ref 0.6–4.47)
LYMPHOCYTES NFR BLD AUTO: 23 % (ref 14–44)
MCH RBC QN AUTO: 29.6 PG (ref 26.8–34.3)
MCHC RBC AUTO-ENTMCNC: 32.4 G/DL (ref 31.4–37.4)
MCV RBC AUTO: 92 FL (ref 82–98)
MONOCYTES # BLD AUTO: 0.6 THOUSAND/ÂΜL (ref 0.17–1.22)
MONOCYTES NFR BLD AUTO: 7 % (ref 4–12)
NEUTROPHILS # BLD AUTO: 5.61 THOUSANDS/ÂΜL (ref 1.85–7.62)
NEUTS SEG NFR BLD AUTO: 68 % (ref 43–75)
NRBC BLD AUTO-RTO: 0 /100 WBCS
PLATELET # BLD AUTO: 305 THOUSANDS/UL (ref 149–390)
PMV BLD AUTO: 9 FL (ref 8.9–12.7)
POTASSIUM SERPL-SCNC: 3.7 MMOL/L (ref 3.5–5.3)
RBC # BLD AUTO: 4.12 MILLION/UL (ref 3.88–5.62)
SODIUM SERPL-SCNC: 140 MMOL/L (ref 135–147)
WBC # BLD AUTO: 8.31 THOUSAND/UL (ref 4.31–10.16)

## 2025-03-28 PROCEDURE — 90471 IMMUNIZATION ADMIN: CPT | Performed by: UROLOGY

## 2025-03-28 PROCEDURE — 80048 BASIC METABOLIC PNL TOTAL CA: CPT | Performed by: NURSE PRACTITIONER

## 2025-03-28 PROCEDURE — 99232 SBSQ HOSP IP/OBS MODERATE 35: CPT | Performed by: NURSE PRACTITIONER

## 2025-03-28 PROCEDURE — 85025 COMPLETE CBC W/AUTO DIFF WBC: CPT | Performed by: NURSE PRACTITIONER

## 2025-03-28 PROCEDURE — 90656 IIV3 VACC NO PRSV 0.5 ML IM: CPT | Performed by: UROLOGY

## 2025-03-28 RX ORDER — PHENAZOPYRIDINE HYDROCHLORIDE 100 MG/1
100 TABLET, FILM COATED ORAL
Status: DISCONTINUED | OUTPATIENT
Start: 2025-03-28 | End: 2025-03-29

## 2025-03-28 RX ORDER — OXYBUTYNIN CHLORIDE 5 MG/1
5 TABLET ORAL 3 TIMES DAILY
Status: DISCONTINUED | OUTPATIENT
Start: 2025-03-28 | End: 2025-03-30 | Stop reason: HOSPADM

## 2025-03-28 RX ADMIN — SODIUM CHLORIDE 125 ML/HR: 0.9 INJECTION, SOLUTION INTRAVENOUS at 06:27

## 2025-03-28 RX ADMIN — TAMSULOSIN HYDROCHLORIDE 0.4 MG: 0.4 CAPSULE ORAL at 16:53

## 2025-03-28 RX ADMIN — PHENAZOPYRIDINE 100 MG: 100 TABLET ORAL at 16:53

## 2025-03-28 RX ADMIN — OXYCODONE HYDROCHLORIDE 5 MG: 5 TABLET ORAL at 13:52

## 2025-03-28 RX ADMIN — SODIUM CHLORIDE FOR IRRIGATION 3000 ML: 0.9 SOLUTION IRRIGATION at 00:24

## 2025-03-28 RX ADMIN — SODIUM CHLORIDE 125 ML/HR: 0.9 INJECTION, SOLUTION INTRAVENOUS at 22:55

## 2025-03-28 RX ADMIN — CEFAZOLIN SODIUM 2000 MG: 2 SOLUTION INTRAVENOUS at 12:42

## 2025-03-28 RX ADMIN — INFLUENZA VIRUS VACCINE 0.5 ML: 15; 15; 15 SUSPENSION INTRAMUSCULAR at 21:22

## 2025-03-28 RX ADMIN — OXYBUTYNIN CHLORIDE 5 MG: 5 TABLET ORAL at 15:09

## 2025-03-28 RX ADMIN — SODIUM CHLORIDE 125 ML/HR: 0.9 INJECTION, SOLUTION INTRAVENOUS at 15:08

## 2025-03-28 RX ADMIN — OXYBUTYNIN CHLORIDE 5 MG: 5 TABLET ORAL at 21:22

## 2025-03-28 RX ADMIN — DOCUSATE SODIUM 100 MG: 100 CAPSULE, LIQUID FILLED ORAL at 16:53

## 2025-03-28 RX ADMIN — CEFAZOLIN SODIUM 2000 MG: 2 SOLUTION INTRAVENOUS at 20:22

## 2025-03-28 RX ADMIN — SODIUM CHLORIDE FOR IRRIGATION 3000 ML: 0.9 SOLUTION IRRIGATION at 05:10

## 2025-03-28 RX ADMIN — CEFAZOLIN SODIUM 2000 MG: 2 SOLUTION INTRAVENOUS at 05:09

## 2025-03-28 NOTE — PLAN OF CARE
Problem: GENITOURINARY - ADULT  Goal: Maintains or returns to baseline urinary function  Description: INTERVENTIONS:- Assess urinary function- Encourage oral fluids to ensure adequate hydration if ordered- Administer IV fluids as ordered to ensure adequate hydration- Administer ordered medications as needed- Offer frequent toileting- Follow urinary retention protocol if ordered  Outcome: Progressing  Goal: Urinary catheter remains patent  Description: INTERVENTIONS:- Assess patency of urinary catheter- If patient has a chronic cruz, consider changing catheter if non-functioning- Follow guidelines for intermittent irrigation of non-functioning urinary catheter  Outcome: Progressing     Problem: HEMATOLOGIC - ADULT  Goal: Maintains hematologic stability  Description: INTERVENTIONS- Assess for signs and symptoms of bleeding or hemorrhage- Monitor labs- Administer supportive blood products/factors as ordered and appropriate  Outcome: Progressing     Problem: PAIN - ADULT  Goal: Verbalizes/displays adequate comfort level or baseline comfort level  Description: Interventions:- Encourage patient to monitor pain and request assistance- Assess pain using appropriate pain scale- Administer analgesics based on type and severity of pain and evaluate response- Implement non-pharmacological measures as appropriate and evaluate response- Consider cultural and social influences on pain and pain management- Notify physician/advanced practitioner if interventions unsuccessful or patient reports new pain  Outcome: Progressing

## 2025-03-28 NOTE — CASE MANAGEMENT
Case Management Assessment & Discharge Planning Note    Patient name Sreedhar Grissom  Location [facility] 811/[facility] 811-01 MRN [id number]  : 1990 Date 3/28/2025       Current Admission Date: 3/27/2025  Current Admission Diagnosis:Painful urination, Hematuria, Left ureteral stone   Patient Active Problem List    Diagnosis Date Noted Date Diagnosed    Closed nondisplaced fracture of middle phalanx of left index finger 10/23/2019     Acute foreign body of index finger 10/23/2019       LOS (days): 0  Geometric Mean LOS (GMLOS) (days):   Days to GMLOS:     OBJECTIVE:     Current admission status: Observation    Preferred Pharmacy:   [facility]/pharmacy #[id number] - [place], [place] - [address]  [place] [address]  Phone: [phone] Fax: [phone]    Primary Care Provider: No primary care provider on file.    Primary Insurance: VIET QURESHI PENDING  Secondary Insurance:     ASSESSMENT:  Active Health Care Proxies    There are no active Health Care Proxies on file.    Readmission Root Cause  30 Day Readmission: No    Patient Information  Admitted from:: Home  Mental Status: Alert  During Assessment patient was accompanied by: Not accompanied during assessment  Assessment information provided by:: Patient  Primary Caregiver: Self  Support Systems: Parent, Self  County of Residence: [place]  [place]  Home entry access options. Select all that apply.: Stairs  Number of steps to enter home.: 2  Do the steps have railings?: No  Type of Current Residence: [address] [place] home  Upon entering residence, is there a bedroom on the main floor (no further steps)?: No  A bedroom is located on the following floor levels of residence (select all that apply):: 2nd Floor  Upon entering residence, is there a bathroom on the main floor (no further steps)?: No  Indicate which floors of current residence have a bathroom (select all the apply):: 2nd Floor  Number of steps to 2nd floor from main floor:  One Flight  Living Arrangements: Lives w/ Parent(s)  Is patient a ?: No    Activities of Daily Living Prior to Admission  Functional Status: Independent  Completes ADLs independently?: Yes  Ambulates independently?: Yes  Does patient use assisted devices?: No  Does patient currently own DME?: No  Does patient have a history of Outpatient Therapy (PT/OT)?: No  Does the patient have a history of Short-Term Rehab?: No  Does patient have a history of HHC?: No  Does patient currently have HHC?: No    Patient Information Continued  Income Source: Employed  Does patient have prescription coverage?: No  Can the patient afford their medications and any related supplies (such as glucometers or test strips)?: Yes  Does patient receive dialysis treatments?: No  Does patient have a history of substance abuse?: No  Does patient have a history of Mental Health Diagnosis?: No    Means of Transportation  Means of Transport to Appts:: Family transport    DISCHARGE DETAILS:    Discharge planning discussed with:: pt at bedside w/   Freedom of Choice: Yes     CM contacted family/caregiver?: No- see comments (A&Ox4)    CM met with pt to introduce role and complete assessment.  Pt lives w/ parents in [address] w/ 2 JUAN M.  IPTA, does not own/use DME, does not drive, employed.  Pt's parents transport pt.  No hx of MH or OLGA.  No hx of STR, HHC, or OP PT.  CM following  Anticipate dc Saturday

## 2025-03-28 NOTE — H&P
H&P -Urology   Name: Sreedhar Grissom 34 year old male I MRN: [id number]   Unit/Bed#: [facility] 811-01 I Date of Admission: 3/27/2025   Date of Service: 3/27/2025 I Hospital Day: 0      Assessment & Plan   Gross hematuria   Presented to emergency department with gross hematuria that started yesterday and urinary retention  Reyna placed for 1 L of urine  Reyna draining punch colored urine on CBI.  Continue CBI overnight  Every 4 manual irrigations  Keep n.p.o. as a precaution  Hemoglobin 14.1, trend  Vital signs normal and stable        Chief Complaint:   gross hematuria and urine retention     History of Present Illness:    Sreedhar Grissom is a 34 y.o. male who recently underwent cystoscopy and stent placement on 3/20 by Dr. Prater due to 7mm left uretal stone.  During surgery, Dr. Alvarado was unable to perform ureteroscopy secondary to tight ureter with inability to get semirigid or flexible ureteroscope through the area.  Patient was discharged home with stent in place and planning for definitive stone management in 2 to 6 weeks time.  Patient was having no difficulty until yesterday at which time he started to have trouble urinating and passing a few small clots and very little urine.  He had some slight suprapubic pressure but no flank pain.  In the emergency department he was bladder scanned for 450 mL of urine.  He had an 18 Georgian three-way catheter placed with return of 1 L of urine and CBI was initiated.  Since earlier today he received 6 L of CBI and his urine remained Sanket-Aid colored without any clots.  CT scan revealed no hydronephrosis, 4 mm density along the course of the left ureter likely the left ureteral calculus previously visualized.  Stent is in appropriate position.    Patient denies any flank or abdominal pain.  Patient says he has minor discomfort at Reyna catheter insertion site.    Review of Systems:    See above    Past Medical and Surgical History:     History reviewed. No pertinent past medical  history.    Past Surgical History:   Procedure Laterality Date    FL RETROGRADE PYELOGRAM  3/20/2025    GUM SURGERY      KY CYSTO/URETERO W/LITHOTRIPSY &INDWELL STENT INSRT Left 3/20/2025    Procedure: CYSTOSCOPY, ABORTED URETEROSCOPY, RETROGRADE PYELOGRAM AND INSERTION STENT URETERAL;  Surgeon: Mathew Prater MD;  Location[facility];  Service: Urology    KY DBRDMT FX&/DISLC SUBQ T/M/F BONE Left 10/29/2019    Procedure: INDEX FINGER REMOVAL OF FOREIGN BODY;  Surgeon: Kalpana Brandon MD;  [facility];  Service: Orthopedics    [place] DBRDMT W/RMVL FM FX&/DISLC SKIN&SUBQ TISSUS Left 10/29/2019    Procedure: INDEX FINGER DEBRIDEMENT (WASH OUT);  Surgeon: Kalpana Brandon MD;  [facility];  Service: Orthopedics       Meds/Allergies:    Prior to Admission medications    Medication Sig Start Date End Date Taking? Authorizing Provider   acetaminophen (TYLENOL) 325 mg tablet Take 2 tablets (650 mg total) by mouth every 6 (six) hours as needed (pain)  Patient not taking: Reported on 3/27/2025 3/14/25   Edelmira Zuleta PA-C   ibuprofen (MOTRIN) 600 mg tablet Take 1 tablet (600 mg total) by mouth every 6 (six) hours as needed (pain)  Patient not taking: Reported on 3/27/2025 3/14/25   Edelmira Zuleta PA-C   ondansetron (ZOFRAN) 4 mg tablet Take 1 tablet (4 mg total) by mouth every 6 (six) hours  Patient not taking: Reported on 3/18/2025 3/10/25   Kita Oleary MD   oxybutynin (DITROPAN) 5 mg tablet Take 1 tablet (5 mg total) by mouth 3 (three) times a day as needed (bladder spasms) for up to 10 days  Patient not taking: Reported on 3/27/2025 3/20/25 3/30/25  Mathew Prater MD   oxyCODONE (Roxicodone) 5 immediate release tablet Take 1 tablet (5 mg total) by mouth every 4 (four) hours as needed for severe pain Max Daily Amount: 30 mg  Patient not taking: Reported on 3/27/2025 3/14/25   Edelmira Zuleta PA-C   tamsulosin (FLOMAX) 0.4 mg Take 1 capsule (0.4 mg total) by mouth daily with dinner for 10  days 3/14/25 3/24/25  Edelmira Zuleta PA-C     I have reviewed home medications with patient personally.    Allergies: No Known Allergies      Social History     Substance and Sexual Activity   Alcohol Use Not Currently     Social History     Tobacco Use   Smoking Status Never   Smokeless Tobacco Never     Social History     Substance and Sexual Activity   Drug Use Never       Family History:    Family history non-contributory    Physical Exam:     Vitals:   Blood Pressure: 139/91 (03/27/25 2019)  Pulse: 70 (03/27/25 2019)  Temperature: 98.7 °F (37.1 °C) (03/27/25 2019)  Temp Source: Tympanic (03/27/25 0946)  Respirations: 16 (03/27/25 2019)  Weight - Scale: 64.4 kg (142 lb) (03/27/25 2024)  SpO2: 97 % (03/27/25 2019)    Physical Exam  Vitals reviewed.   Constitutional:       Appearance: Normal appearance.   HENT:      Head: Normocephalic and atraumatic.   Cardiovascular:      Rate and Rhythm: Normal rate.      Pulses: Normal pulses.   Pulmonary:      Effort: Pulmonary effort is normal.   Abdominal:      General: Bowel sounds are normal. There is no distension.      Palpations: Abdomen is soft.      Tenderness: There is no abdominal tenderness.   Genitourinary:     Comments: Reyna catheter draining pale pink urine  Musculoskeletal:         General: Normal range of motion.      Cervical back: Normal range of motion.   Skin:     General: Skin is warm and dry.   Neurological:      Mental Status: He is alert and oriented to person, place, and time.   Psychiatric:         Mood and Affect: Mood normal.         Behavior: Behavior normal.         Thought Content: Thought content normal.         Judgment: Judgment normal.           Additional Data:     Lab Results:     Results from last 7 days   Lab Units 03/27/25  1018   WBC Thousand/uL 11.26*   HEMOGLOBIN g/dL 14.1   HEMATOCRIT % 41.6   PLATELETS Thousands/uL 378     Results from last 7 days   Lab Units 03/27/25  1018   SODIUM mmol/L 139   POTASSIUM mmol/L 3.9   CHLORIDE  mmol/L 104   CO2 mmol/L 25   BUN mg/dL 18   CREATININE mg/dL 0.87   ANION GAP mmol/L 10   CALCIUM mg/dL 9.5   GLUCOSE RANDOM mg/dL 98                       Imaging: Results Review Statement: No pertinent imaging studies reviewed.    CT renal stone study abdomen pelvis wo contrast   Final Result by Robby Ravi MD (03/27 1845)      Status post interval left ureteral stent placement. No hydronephrosis. 4 mm density along the course of the left ureter may represent a left ureteral calculus.            Workstation performed: [id number]         XR abdomen 1 view kub   ED Interpretation by Darrick Ariza DO (03/27 2027)   KUB x-ray interpreted by me shows left-sided ureteral stent, appears to be properly positioned      Final Result by Hieu Eduardo MD (03/27 0607)      No radiopaque urinary tract calculi.            Workstation performed: [id number]           VTE Prophylaxis: Pharmacologic VTE Prophylaxis contraindicated due to gross hematuria  / sequential compression device   Code Status: Full       Anticipated Length of Stay:  Patient will be admitted on an Observation basis with an anticipated length of stay of  less than 2 midnights.   Justification for Hospital Stay: Gross hematuria     Total Time for Visit, including Counseling / Coordination of Care: 30 minutes.  Greater than 50% of this total time spent on direct patient counseling and coordination of care.      [employer] / NOSTROMO ICT Records Reviewed: No     ** Please Note: This note has been constructed using a voice recognition system. **

## 2025-03-28 NOTE — PROGRESS NOTES
Progress Note - Urology   Name: Sreedhar Grissom 34 y.o. male I MRN: [id number]  Unit/Bed#: [facility] 811-01 I Date of Admission: 3/27/2025   Date of Service: 3/28/2025 I Hospital Day: 0     Assessment & Plan    Gross hematuria   Presented to emergency department 3/27 with gross hematuria that started 3/26 and urinary retention  Reyna placed for 1 L of urine in ED  Reyna had been draining punch colored urine on CBI.  Urine clearing this am, pale pink. CBI clamped   Q4 manual irrigations  Keep n.p.o. as a precaution until re eval later this am   Hemoglobin 14.1 yesterday, am labs pending   Temp 99, UA pending       Subjective : Patient is resting comfortably in bed.  No issues with Reyna catheter overnight, urine appears to be clearing.  Denies any abdominal pain or suprapubic pressure.    Objective :  Temp:  [98.7 °F (37.1 °C)-99.1 °F (37.3 °C)] 99.1 °F (37.3 °C)  HR:  [] 70  BP: (124-176)/(75-92) 139/87  Resp:  [16-18] 16  SpO2:  [97 %-99 %] 97 %  O2 Device: None (Room air)    Physical Exam  Vitals reviewed.   Constitutional:       Appearance: Normal appearance.   HENT:      Head: Normocephalic and atraumatic.   Cardiovascular:      Rate and Rhythm: Normal rate.   Pulmonary:      Effort: Pulmonary effort is normal.   Abdominal:      General: Bowel sounds are normal. There is no distension.      Palpations: Abdomen is soft.      Tenderness: There is no abdominal tenderness.   Genitourinary:     Comments: Urine pale pink, CBI clamped   Musculoskeletal:         General: Normal range of motion.      Cervical back: Normal range of motion.   Skin:     General: Skin is warm and dry.   Neurological:      Mental Status: He is alert and oriented to person, place, and time.   Psychiatric:         Mood and Affect: Mood normal.         Behavior: Behavior normal.         Thought Content: Thought content normal.         Judgment: Judgment normal.         Lab Results: I have reviewed the following results:CBC/BMP:   .      03/27/25  1018   WBC 11.26*   HGB 14.1   HCT 41.6      SODIUM 139   K 3.9      CO2 25   BUN 18   CREATININE 0.87   GLUC 98    , Creatinine Clearance: Estimated Creatinine Clearance: 97.2 mL/min (by C-G formula based on SCr of 0.87 mg/dL).        VTE Pharmacologic Prophylaxis: Sequential compression device (Venodyne)   VTE Mechanical Prophylaxis: sequential compression device

## 2025-03-28 NOTE — PLAN OF CARE
Problem: GENITOURINARY - ADULT  Goal: Maintains or returns to baseline urinary function  Description: INTERVENTIONS:- Assess urinary function- Encourage oral fluids to ensure adequate hydration if ordered- Administer IV fluids as ordered to ensure adequate hydration- Administer ordered medications as needed- Offer frequent toileting- Follow urinary retention protocol if ordered  Outcome: Progressing  Goal: Urinary catheter remains patent  Description: INTERVENTIONS:- Assess patency of urinary catheter- If patient has a chronic cruz, consider changing catheter if non-functioning- Follow guidelines for intermittent irrigation of non-functioning urinary catheter  Outcome: Progressing     Problem: HEMATOLOGIC - ADULT  Goal: Maintains hematologic stability  Description: INTERVENTIONS- Assess for signs and symptoms of bleeding or hemorrhage- Monitor labs- Administer supportive blood products/factors as ordered and appropriate  Outcome: Progressing

## 2025-03-28 NOTE — TREATMENT PLAN
Sreedhar Grissom  1990  [id number]  03/28/25                 Urology Interim Treatment Plan        Sreedhar Grissom is a 34-year-old male with history of nephroureterolithiasis and left 7 mm ureteral calculus status post cystoscopy and technically difficult left ureteral stent placement 3/20 by Dr. Prater.  He was for planned follow-up definitive stone treatment after a prescribed duration of passive ureteral dilatation.  He presented to the emergency room with gross hematuria and urinary retention.    He was admitted to our service.  CT was negative for substantial intravesical clot.  Urine culture negative.    He is seen in quick follow-up this a.m.  Afebrile, hemodynamically stable without complaints of flank, abdominal or suprapubic pain.  CBI clamped--return of blush urine.  Manual irrigation--negative for clot.    Labs acceptable--refer to below      Plan:    Resume diet  Resume CBI  Manual irrigation every 4 hours  Clamp again at midnight and reevaluate in a.m.  N.p.o. after midnight as a precaution only        Vitals:    03/28/25 0827   BP: 114/77   Pulse: 62   Resp: 18   Temp: 98.1 °F (36.7 °C)   SpO2: 98%               Lab Results   Component Value Date    WBC 8.31 03/28/2025    HGB 12.2 03/28/2025    HCT 37.7 03/28/2025    MCV 92 03/28/2025     03/28/2025     Lab Results   Component Value Date    SODIUM 140 03/28/2025    K 3.7 03/28/2025     03/28/2025    CO2 28 03/28/2025    BUN 14 03/28/2025    CREATININE 0.99 03/28/2025    GLUC 95 03/28/2025    CALCIUM 8.7 03/28/2025       Image Report    Procedure Component Value Units Date/Time   CT renal stone study abdomen pelvis wo contrast [[id number]] Collected: 03/27/25 1821   Order Status: Completed Updated: 03/27/25 1846   Narrative:     CT ABDOMEN AND PELVIS WITHOUT IV CONTRAST - LOW DOSE RENAL STONE    INDICATION: hematuria, hx of L ureteral stent.    COMPARISON: CT abdomen/pelvis March 10, 2025    TECHNIQUE: Low radiation dose thin section CT  examination of the abdomen and pelvis was performed without intravenous or oral contrast according to a protocol specifically designed to evaluate for urinary tract calculus. Axial, sagittal, and coronal 2D  reformatted images were created from the source data and submitted for interpretation. Evaluation for pathology in the abdomen and pelvis that is unrelated to urinary tract calculi is limited.    Radiation dose length product (DLP) for this visit: 218 mGy-cm . This examination, like all CT scans performed in the [facility], was performed utilizing techniques to minimize radiation dose exposure, including the use of iterative  reconstruction and automated exposure control.    URINARY TRACT FINDINGS:    RIGHT KIDNEY AND URETER: No urinary tract calculi. No hydronephrosis or hydroureter.    LEFT KIDNEY AND URETER: Status post interval left ureteral stent placement. No hydronephrosis. A 4 mm density along the course of the left ureter (301/140) may represent a left ureteral calculus.    URINARY BLADDER: Reyna catheter present within the urinary bladder.      ADDITIONAL FINDINGS:    LOWER CHEST: Unchanged 2 mm nodule at the right lung base (301/7). Based on current Fleischner Society 2017 Guidelines on incidental pulmonary nodule, in this patient who is less than 36 years of age, management decisions should be made on a case-by-case   basis, keeping in mind that infectious/inflammatory causes are more likely than cancer and that use of serial CT should be minimized. Bibasilar atelectasis.    SOLID VISCERA: Limited low radiation dose noncontrast CT evaluation demonstrates no clinically significant abnormality of the imaged portions of the liver, spleen, pancreas, or adrenal glands.    GALLBLADDER/BILIARY TREE: No calcified gallstones. No pericholecystic inflammatory change. No biliary dilation.    STOMACH AND BOWEL: Unremarkable.    APPENDIX: No findings to suggest appendicitis.    ABDOMINOPELVIC  CAVITY: No ascites. No pneumoperitoneum. No lymphadenopathy.    VESSELS: Unremarkable for patient's age.    REPRODUCTIVE ORGANS: Unremarkable for patient's age.    ABDOMINAL WALL/INGUINAL REGIONS: Unremarkable.    BONES: No acute fracture or suspicious osseous lesion.   Impression:       Status post interval left ureteral stent placement. No hydronephrosis. 4 mm density along the course of the left ureter may represent a left ureteral calculus.        Workstation performed: [id number]               RANDY Garza

## 2025-03-28 NOTE — UTILIZATION REVIEW
Initial Clinical Review    Pt initially admitted as Observation on 3/27 converted to Inpatient on 3/29.  Pt requiring continued stay due to Gross Hematuria.     Admission: Date/Time/Statement:   Admission Orders (From admission, onward)       Ordered        03/29/25 0804  INPATIENT ADMISSION  Once            03/27/25 1938  Place in Observation  Once                          Orders Placed This Encounter   Procedures    INPATIENT ADMISSION     Standing Status:   Standing     Number of Occurrences:   1     Level of Care:   Med Surg [16]     Estimated length of stay:   More than 2 Midnights     Certification:   I certify that inpatient services are medically necessary for this patient for a duration of greater than two midnights. See H&P and MD Progress Notes for additional information about the patient's course of treatment.     ED Arrival Information       Expected   -    Arrival   3/27/2025 09:35    Acuity   Urgent              Means of arrival   Walk-In    Escorted by   Self    Service   Urology    Admission type   Emergency              Arrival complaint   Lower abd pain             Chief Complaint   Patient presents with    Blood in Urine     Pt had kidney stone removed on 3/20 yesterday reported blood in urine and pain, no fever       Initial Presentation: 34 y.o. male with PMHx: kidney stones, who presented on 3/27/25 to ED initially admitted Observation status then converted to Inpatient due to Gross Hematuria.  Patient recently underwent cystoscopy and stent placement on 3/20 due to 7mm left uretal stone.  During surgery, Dr. Alvarado was unable to perform ureteroscopy secondary to tight ureter with inability to get semirigid or flexible ureteroscope through the area.  Patient was discharged home with stent in place and planning for definitive stone management in 2 to 6 weeks time.  On 3/26, patient started to have trouble urinating and passing a few small clots and very little urine along with some slight  suprapubic pressure.  In the ED, he was bladder scanned for 450 mL of urine.  He had an 18 Togolese three-way catheter placed with return of 1 L of urine and CBI was initiated. He received 6 L of CBI and his urine remained Sanket-Aid colored without any clots.  CT scan revealed no hydronephrosis, 4 mm density along the course of the left ureter likely the left ureteral calculus previously visualized.  Stent is in appropriate position.    3/27/2025 Admit to Urology, Observation status.  Plan:   med surg, IVF resuscitation, CBI overnight and attempt to wean in AM. No additional surgical interventions are required at the present time the patient will require close monitoring     3/29/2025 Inpatient Admission:  No issues with Reyna catheter overnight, urine appears to be clearing. Denies any abdominal pain or suprapubic pressure. Urine clearing this am, pale pink. CBI clamped. Q4 manual irrigations. Keep NPO, monitor labs including hgb, FU on UA, monitor temp.     Date:   3/30   Day 2:  Per Urology: 5 mm distal left ureteral calculus, intractable hematuria.  Recommend cystoscopy with clot evacuation and fulguration. I will also attempt to perform left ureteroscopy as the stent could potentially be the etiology for the hematuria. If the scope easily passes into the distal left ureter I will attempt to remove the stone. It is possible that the caliber of the ureter still remains narrow, and that additional time for ureteral dilatation with a stent in place may be required. The patient understands that he may require an additional procedure to remove the left ureteral calculus.     3/30 OP Note:  Procedure(s):  Cystoscopy, clot evacuation, fulguration of bleeding source at the left UVJ, left ureteroscopy, holmium laser lithotripsy of left distal ureteral calculus, basket stone extraction, left retrograde pyelogram, exchange of left double-J ureteral stent  Anesthesia Type: General  Operative Findings:  Bladder filled with  clot.  Identifiable bleeding source from 2 small pinpoint arterial bleeders located on the bladder mucosal surface on the medial aspect of the left ureteral orifice.  Bugbee electrocautery used for hemostasis.  Left ureteroscopy performed.  Holmium laser lithotripsy performed of the distal left ureteral calculus.  Fragments removed with a Skylight basket and a new left 24-6 Nepali double-J ureteral stent with string was placed.     3/30 UPDATE PER UROLOGY: As long as the patient is tolerating p.o., pain is controlled, vital signs are stable, and he is able to urinate, the patient can be discharged home later today with 3 days of Keflex and 5 Norco tablets. Patient can remove his own stent on Wednesday morning.     ED Treatment-Medication Administration from 03/27/2025 0935 to 03/27/2025 2008         Date/Time Order Dose Route Action     03/27/2025 1017 ketorolac (TORADOL) injection 15 mg 15 mg Intravenous Given            Scheduled Medications:  cephalexin, 500 mg, Oral, Q8H BRE  docusate sodium, 100 mg, Oral, BID  oxybutynin, 5 mg, Oral, TID  tamsulosin, 0.4 mg, Oral, Daily With Dinner      Continuous IV Infusions:  sodium chloride, 125 mL/hr, Intravenous, Continuous  sodium chloride, 3,000 mL, Irrigation, Continuous      PRN Meds:  acetaminophen, 650 mg, Oral, Q6H PRN  lactated ringers, 1,000 mL, Intravenous, Once PRN   And  lactated ringers, 1,000 mL, Intravenous, Once PRN  oxyCODONE, 5 mg, Oral, Q4H PRN x2  sodium chloride, 1,000 mL, Intravenous, Once PRN   And  sodium chloride, 1,000 mL, Intravenous, Once PRN    ketorolac (TORADOL) injection 15 mg  Dose: 15 mg  Freq: Every 6 hours PRN Route: IV z2  PRN Reason: moderate pain  Start: 03/27/25 1942 End: 03/29/25 1941    ED Triage Vitals   Temperature Pulse Respirations Blood Pressure SpO2 Pain Score   03/27/25 0946 03/27/25 0946 03/27/25 0946 03/27/25 0946 03/27/25 0946 03/27/25 1017   98.8 °F (37.1 °C) 100 18 (!) 176/92 97 % 7     Weight (last 2 days)       None             Vital Signs (last 3 days)       Date/Time Temp Pulse Resp BP MAP (mmHg) SpO2 O2 Device Cardiac (WDL) Patient Position - Orthostatic VS Pain    03/30/25 0953 -- -- -- -- -- -- -- -- -- 8    03/30/25 09:48:13 97.4 °F (36.3 °C) 60 18 124/79 94 99 % -- -- -- --    03/30/25 0920 97.6 °F (36.4 °C) 63 18 116/54 78 98 % None (Room air) WDL -- --    03/30/25 0904 97.5 °F (36.4 °C) 71 17 121/67 89 97 % None (Room air) WDL -- No Pain    03/29/25 22:08:45 98.1 °F (36.7 °C) 80 18 137/81 100 97 % -- -- -- --    03/29/25 1930 -- -- -- -- -- -- None (Room air) -- -- No Pain    03/29/25 15:37:39 98.5 °F (36.9 °C) 66 16 131/78 96 97 % -- -- -- --    03/29/25 1121 -- -- -- -- -- -- -- -- -- 7    03/29/25 0745 -- -- -- -- -- -- -- -- -- 5    03/29/25 0738 98.3 °F (36.8 °C) 59 16 135/77 96 99 % -- -- -- --    03/29/25 0510 -- -- -- -- -- -- -- -- -- 6    03/28/25 22:52:52 98.3 °F (36.8 °C) 63 16 113/71 85 97 % -- -- -- --    03/28/25 2020 -- -- -- -- -- -- -- -- -- 1    03/28/25 15:49:02 99.2 °F (37.3 °C) 73 16 126/77 93 97 % -- -- -- --    03/28/25 1352 -- -- -- -- -- -- -- -- -- 8    03/28/25 0850 -- -- -- -- -- -- -- -- -- No Pain    03/28/25 08:27:19 98.1 °F (36.7 °C) 62 18 114/77 89 98 % -- -- -- --    03/27/25 22:44:32 99.1 °F (37.3 °C) 70 16 139/87 104 97 % -- -- -- --    03/27/25 2024 -- -- -- -- -- -- -- -- -- 2    03/27/25 20:19:20 98.7 °F (37.1 °C) 70 16 139/91 107 97 % -- -- -- --    03/27/25 2000 -- 71 18 137/82 105 97 % None (Room air) -- Lying --    03/27/25 1905 -- -- -- -- -- -- -- -- -- 5    03/27/25 1900 -- 69 18 136/84 105 98 % None (Room air) -- Lying 5    03/27/25 1859 -- -- -- -- -- -- None (Room air) -- -- --    03/27/25 1700 -- 73 -- 138/79 103 99 % None (Room air) -- -- --    03/27/25 1500 -- 67 -- 131/81 103 98 % None (Room air) -- -- --    03/27/25 1400 -- 71 -- 135/84 105 98 % None (Room air) -- -- --    03/27/25 1329 -- -- -- -- -- -- -- -- -- No Pain    03/27/25 1300 -- 65 18 124/75 95 98 % None  (Room air) -- -- --    03/27/25 1200 -- 65 -- 134/84 103 98 % -- -- -- --    03/27/25 1017 -- -- -- -- -- -- -- -- -- 7    03/27/25 0946 98.8 °F (37.1 °C) 100 18 176/92 -- 97 % None (Room air) -- Sitting --              Pertinent Labs/Diagnostic Test Results:   Radiology:  CT renal protocol   Final Interpretation by Concepcion Swan MD (03/29 1655)      1) Blood products (approximately 130 mL) within the urinary bladder with evidence of active intraluminal hemorrhage. No discrete bladder mass identified. Reyna catheter in place.      2) Left ureteral stent in place with trace left hydronephrosis and mild delayed left-sided nephrogram. 5 mm calculus in distal left ureter adjacent to the stent, approximately 1 cm caudad compared to 3/27/2025.      3) No upper tract urothelial lesions.      4) Additional findings as above.      The study was marked in EPIC for immediate notification.               Workstation performed: [id number]         CT renal stone study abdomen pelvis wo contrast   Final Interpretation by Robby Ravi MD (03/27 1845)      Status post interval left ureteral stent placement. No hydronephrosis. 4 mm density along the course of the left ureter may represent a left ureteral calculus.            Workstation performed: [id number]         XR abdomen 1 view kub   ED Interpretation by Darrick Ariza DO (03/27 6704)   KUB x-ray interpreted by me shows left-sided ureteral stent, appears to be properly positioned      Final Interpretation by Hieu Eduardo MD (03/27 6487)      No radiopaque urinary tract calculi.            Workstation performed: [id number]         FL retrograde pyelogram    (Results Pending)     Cardiology:  No orders to display     GI:  No orders to display           Results from last 7 days   Lab Units 03/30/25  0548 03/29/25  2056 03/29/25  0517 03/28/25  0633 03/27/25  1018   WBC Thousand/uL 5.57  --  5.62 8.31 11.26*   HEMOGLOBIN g/dL 11.8* 11.8* 11.7* 12.2 14.1    HEMATOCRIT % 34.3* 35.8* 35.8* 37.7 41.6   PLATELETS Thousands/uL 300  --  291 305 378   TOTAL NEUT ABS Thousands/µL  --   --   --  5.61  --          Results from last 7 days   Lab Units 03/28/25  0633 03/27/25  1018   SODIUM mmol/L 140 139   POTASSIUM mmol/L 3.7 3.9   CHLORIDE mmol/L 106 104   CO2 mmol/L 28 25   ANION GAP mmol/L 6 10   BUN mg/dL 14 18   CREATININE mg/dL 0.99 0.87   EGFR ml/min/1.73sq m 99 113   CALCIUM mg/dL 8.7 9.5     Results from last 7 days   Lab Units 03/28/25  0633 03/27/25  1018   GLUCOSE RANDOM mg/dL 95 98     Results from last 7 days   Lab Units 03/27/25  1018   CLARITY UA  Turbid   COLOR UA  Red   SPEC GRAV UA  1.026   PH UA  Interference-unable to analyze*   GLUCOSE UA mg/dl Interference- unable to analyze*   KETONES UA mg/dl Interference- unable to analyze*   BLOOD UA  Large*   PROTEIN UA mg/dl Interference- unable to analyze*   NITRITE UA  Interference- unable to analyze   BILIRUBIN UA  Interference- unable to analyze*   LEUKOCYTES UA  Interference- unable to analyze*   WBC UA /hpf Field obscured, unable to enumerate*   RBC UA /hpf Innumerable*   BACTERIA UA /hpf Field obscured, unable to enumerate*   EPITHELIAL CELLS WET PREP /hpf Field obscured, unable to enumerate*     Results from last 7 days   Lab Units 03/27/25  1018   URINE CULTURE  No Growth <1000 cfu/mL     History reviewed. No pertinent past medical history.  Present on Admission:  **None**      Admitting Diagnosis: Painful urination [R30.9]  Hematuria [R31.9]  Left ureteral stone [N20.1]  Age/Sex: 34 y.o. male    Network Utilization Review Department  ATTENTION: Please call with any questions or concerns to [phone] and carefully listen to the prompts so that you are directed to the right person. All voicemails are confidential.   For Discharge needs, contact Care Management DC Support Team at [phone] opt. 2  Send all requests for admission clinical reviews, approved or denied determinations and any other requests  to dedicated fax number below belonging to the campus where the patient is receiving treatment. List of dedicated fax numbers for the Facilities:  FACILITY NAME UR FAX NUMBER   ADMISSION DENIALS (Administrative/Medical Necessity) [phone]   DISCHARGE SUPPORT TEAM (NETWORK) [phone]   PARENT CHILD HEALTH (Maternity/NICU/Pediatrics) [phone]   [facility] [phone]   [facility] [phone]   [facility] [phone]   [facility] [phone]   [facility] [phone]   [facility] [phone]   [facility] [phone]   [facility] [phone]   [facility] [phone]   [facility] [phone]   [facility] [phone]   [facility] [phone]

## 2025-03-29 ENCOUNTER — APPOINTMENT (INPATIENT)
Dept: RADIOLOGY | Facility: HOSPITAL | Age: 35
DRG: 445 | End: 2025-03-29
Payer: COMMERCIAL

## 2025-03-29 PROBLEM — R31.0 GROSS HEMATURIA: Status: ACTIVE | Noted: 2025-03-29

## 2025-03-29 LAB
ERYTHROCYTE [DISTWIDTH] IN BLOOD BY AUTOMATED COUNT: 12.1 % (ref 11.6–15.1)
HCT VFR BLD AUTO: 35.8 % (ref 36.5–49.3)
HCT VFR BLD AUTO: 35.8 % (ref 36.5–49.3)
HGB BLD-MCNC: 11.7 G/DL (ref 12–17)
HGB BLD-MCNC: 11.8 G/DL (ref 12–17)
MCH RBC QN AUTO: 29.6 PG (ref 26.8–34.3)
MCHC RBC AUTO-ENTMCNC: 32.7 G/DL (ref 31.4–37.4)
MCV RBC AUTO: 91 FL (ref 82–98)
PLATELET # BLD AUTO: 291 THOUSANDS/UL (ref 149–390)
PMV BLD AUTO: 9.1 FL (ref 8.9–12.7)
RBC # BLD AUTO: 3.95 MILLION/UL (ref 3.88–5.62)
WBC # BLD AUTO: 5.62 THOUSAND/UL (ref 4.31–10.16)

## 2025-03-29 PROCEDURE — 74178 CT ABD&PLV WO CNTR FLWD CNTR: CPT

## 2025-03-29 PROCEDURE — 85014 HEMATOCRIT: CPT | Performed by: PHYSICIAN ASSISTANT

## 2025-03-29 PROCEDURE — 85018 HEMOGLOBIN: CPT | Performed by: PHYSICIAN ASSISTANT

## 2025-03-29 PROCEDURE — 85027 COMPLETE CBC AUTOMATED: CPT | Performed by: PHYSICIAN ASSISTANT

## 2025-03-29 PROCEDURE — 99232 SBSQ HOSP IP/OBS MODERATE 35: CPT | Performed by: PHYSICIAN ASSISTANT

## 2025-03-29 RX ORDER — CEFAZOLIN SODIUM 2 G/50ML
2000 SOLUTION INTRAVENOUS ONCE
Status: CANCELLED | OUTPATIENT
Start: 2025-03-29 | End: 2025-03-29

## 2025-03-29 RX ORDER — SODIUM CHLORIDE 9 MG/ML
125 INJECTION, SOLUTION INTRAVENOUS CONTINUOUS
Status: DISCONTINUED | OUTPATIENT
Start: 2025-03-30 | End: 2025-03-30 | Stop reason: HOSPADM

## 2025-03-29 RX ADMIN — PHENAZOPYRIDINE 100 MG: 100 TABLET ORAL at 07:59

## 2025-03-29 RX ADMIN — CEFAZOLIN SODIUM 2000 MG: 2 SOLUTION INTRAVENOUS at 11:22

## 2025-03-29 RX ADMIN — KETOROLAC TROMETHAMINE 15 MG: 30 INJECTION, SOLUTION INTRAMUSCULAR; INTRAVENOUS at 05:10

## 2025-03-29 RX ADMIN — SODIUM CHLORIDE 125 ML/HR: 0.9 INJECTION, SOLUTION INTRAVENOUS at 23:58

## 2025-03-29 RX ADMIN — OXYBUTYNIN CHLORIDE 5 MG: 5 TABLET ORAL at 07:59

## 2025-03-29 RX ADMIN — IOHEXOL 85 ML: 350 INJECTION, SOLUTION INTRAVENOUS at 10:41

## 2025-03-29 RX ADMIN — TAMSULOSIN HYDROCHLORIDE 0.4 MG: 0.4 CAPSULE ORAL at 16:56

## 2025-03-29 RX ADMIN — CEFAZOLIN SODIUM 2000 MG: 2 SOLUTION INTRAVENOUS at 21:07

## 2025-03-29 RX ADMIN — OXYBUTYNIN CHLORIDE 5 MG: 5 TABLET ORAL at 21:07

## 2025-03-29 RX ADMIN — DOCUSATE SODIUM 100 MG: 100 CAPSULE, LIQUID FILLED ORAL at 07:59

## 2025-03-29 RX ADMIN — CEFAZOLIN SODIUM 2000 MG: 2 SOLUTION INTRAVENOUS at 04:15

## 2025-03-29 RX ADMIN — KETOROLAC TROMETHAMINE 15 MG: 30 INJECTION, SOLUTION INTRAMUSCULAR; INTRAVENOUS at 11:21

## 2025-03-29 RX ADMIN — OXYBUTYNIN CHLORIDE 5 MG: 5 TABLET ORAL at 16:56

## 2025-03-29 RX ADMIN — SODIUM CHLORIDE 125 ML/HR: 0.9 INJECTION, SOLUTION INTRAVENOUS at 05:19

## 2025-03-29 NOTE — NURSING NOTE
"Patient called this RN to his room, states, \"my catheter is leaking\". This RN observed bloody urine and blood clots leaking around his catheter. Order to manually irrigate CBI. Manually irrigated with 250cc of normal saline with moderate amount of clots removed, observed bloody urine flowing through catheter, doesn't appear to be obstructed any longer. Bladder scan obtained 131cc.  Provider notified, RANDY Garza.   "

## 2025-03-29 NOTE — ASSESSMENT & PLAN NOTE
Status post cystoscopy aborted ureteroscopy retrograde pyelogram ureteral stent insertion 3/20  Presenting to the ED 3/26 discovered to be in urinary retention status post Reyna catheter insertion for over 1 L of urine and patient returning back to the emergency room 3/27 due to gross hematuria.  Patient admitted to urology service  Started on CBI and manual irrigations.  CBI clamped x 2 with persistent gross hematuria bright red unable to wean off.  Continue CBI manual irrigations  Will obtain CT renal protocol for further evaluation as unexplained hematuria.  Hemoglobin 14.1 on admission, repeat 12.2--11.7 plan for repeat again this afternoon.  N.p.o. at midnight will plan for or tomorrow if not improving

## 2025-03-29 NOTE — PROGRESS NOTES
Progress Note - Urology   Name: Sreedhar Grissom 34 y.o. male I MRN: [id number]  Unit/Bed#: [facility] 811-01 I Date of Admission: 3/27/2025   Date of Service: 3/29/2025 I Hospital Day: 0     Assessment & Plan  Gross hematuria  Status post cystoscopy aborted ureteroscopy retrograde pyelogram ureteral stent insertion 3/20  Presenting to the ED 3/26 discovered to be in urinary retention status post Reyna catheter insertion for over 1 L of urine and patient returning back to the emergency room 3/27 due to gross hematuria.  Patient admitted to urology service  Started on CBI and manual irrigations.  CBI clamped x 2 with persistent gross hematuria bright red unable to wean off.  Continue CBI manual irrigations  Will obtain CT renal protocol for further evaluation as unexplained hematuria.  Hemoglobin 14.1 on admission, repeat 12.2--11.7 plan for repeat again this afternoon.  N.p.o. at midnight will plan for or tomorrow if not improving        Urology service will follow.    Subjective   Patient sitting comfortably in bed.  He denies any abdominal pain.  Reports occasional spasms and leaking of urine from around catheter and clots.    Objective :  Temp:  [98.1 °F (36.7 °C)-99.2 °F (37.3 °C)] 98.3 °F (36.8 °C)  HR:  [62-73] 63  BP: (113-126)/(71-77) 113/71  Resp:  [16-18] 16  SpO2:  [97 %-98 %] 97 %    I/O         03/27 0701  03/28 0700 03/28 0701  03/29 0700    P.O.  240    I.V. (mL/kg) 787.5 (12.2) 1993.8 (31)    IV Piggyback 100 50    Total Intake(mL/kg) 887.5 (13.8) 2283.8 (35.5)    Urine (mL/kg/hr) 2975 3095 (2)    Other 6150     Total Output 9125 3095    Net -8237.5 -811.2                Lines/Drains/Airways       Active Status       Name Placement date Placement time Site Days    Urethral Catheter Three way 18 Fr. 03/27/25  1113  Three way  1                  Physical Exam  Constitutional:       General: He is not in acute distress.     Appearance: He is normal weight. He is not ill-appearing, toxic-appearing or diaphoretic.    HENT:      Head: Normocephalic and atraumatic.      Right Ear: External ear normal.      Left Ear: External ear normal.      Nose: Nose normal.      Mouth/Throat:      Pharynx: Oropharynx is clear.   Eyes:      General: No scleral icterus.     Conjunctiva/sclera: Conjunctivae normal.   Cardiovascular:      Rate and Rhythm: Normal rate and regular rhythm.      Heart sounds: No murmur heard.     No friction rub. No gallop.   Pulmonary:      Effort: Pulmonary effort is normal. No respiratory distress.      Breath sounds: No wheezing, rhonchi or rales.   Abdominal:      General: Bowel sounds are normal. There is no distension.      Tenderness: There is no abdominal tenderness.   Genitourinary:     Comments: No clots removed upon manual irrigation.  However patient's urine persistently bloody and active bleeding noted when running CBI as urine would clear intermittently and began to turn bright red again.  CBI currently running all the way open.  Musculoskeletal:         General: Normal range of motion.      Cervical back: Normal range of motion.   Skin:     General: Skin is warm and dry.   Neurological:      General: No focal deficit present.      Mental Status: He is alert and oriented to person, place, and time.           Lab Results: I have reviewed the following results:  Recent Labs     03/28/25  0633   WBC 8.31   HGB 12.2   HCT 37.7      SODIUM 140   K 3.7      CO2 28   BUN 14   CREATININE 0.99   GLUC 95         VTE Pharmacologic Prophylaxis: Reason for no pharmacologic prophylaxis hematuria  VTE Mechanical Prophylaxis: sequential compression device    Kasandra Adams PA-C

## 2025-03-30 ENCOUNTER — ANESTHESIA EVENT (INPATIENT)
Dept: PERIOP | Facility: HOSPITAL | Age: 35
DRG: 445 | End: 2025-03-30
Payer: COMMERCIAL

## 2025-03-30 ENCOUNTER — APPOINTMENT (INPATIENT)
Dept: RADIOLOGY | Facility: HOSPITAL | Age: 35
DRG: 445 | End: 2025-03-30
Payer: COMMERCIAL

## 2025-03-30 ENCOUNTER — ANESTHESIA (INPATIENT)
Dept: PERIOP | Facility: HOSPITAL | Age: 35
DRG: 445 | End: 2025-03-30
Payer: COMMERCIAL

## 2025-03-30 VITALS
SYSTOLIC BLOOD PRESSURE: 124 MMHG | HEIGHT: 63 IN | BODY MASS INDEX: 25.16 KG/M2 | WEIGHT: 142 LBS | OXYGEN SATURATION: 97 % | DIASTOLIC BLOOD PRESSURE: 77 MMHG | HEART RATE: 72 BPM | RESPIRATION RATE: 16 BRPM | TEMPERATURE: 98.3 F

## 2025-03-30 LAB
ERYTHROCYTE [DISTWIDTH] IN BLOOD BY AUTOMATED COUNT: 12.2 % (ref 11.6–15.1)
HCT VFR BLD AUTO: 34.3 % (ref 36.5–49.3)
HGB BLD-MCNC: 11.8 G/DL (ref 12–17)
MCH RBC QN AUTO: 30.6 PG (ref 26.8–34.3)
MCHC RBC AUTO-ENTMCNC: 34.4 G/DL (ref 31.4–37.4)
MCV RBC AUTO: 89 FL (ref 82–98)
PLATELET # BLD AUTO: 300 THOUSANDS/UL (ref 149–390)
PMV BLD AUTO: 9.5 FL (ref 8.9–12.7)
RBC # BLD AUTO: 3.85 MILLION/UL (ref 3.88–5.62)
WBC # BLD AUTO: 5.57 THOUSAND/UL (ref 4.31–10.16)

## 2025-03-30 PROCEDURE — C1769 GUIDE WIRE: HCPCS | Performed by: UROLOGY

## 2025-03-30 PROCEDURE — C2625 STENT, NON-COR, TEM W/DEL SY: HCPCS | Performed by: UROLOGY

## 2025-03-30 PROCEDURE — 0W3R8ZZ CONTROL BLEEDING IN GENITOURINARY TRACT, VIA NATURAL OR ARTIFICIAL OPENING ENDOSCOPIC: ICD-10-PCS | Performed by: UROLOGY

## 2025-03-30 PROCEDURE — 99232 SBSQ HOSP IP/OBS MODERATE 35: CPT | Performed by: UROLOGY

## 2025-03-30 PROCEDURE — 74420 UROGRAPHY RTRGR +-KUB: CPT

## 2025-03-30 PROCEDURE — 85027 COMPLETE CBC AUTOMATED: CPT | Performed by: PHYSICIAN ASSISTANT

## 2025-03-30 PROCEDURE — 0TP98DZ REMOVAL OF INTRALUMINAL DEVICE FROM URETER, VIA NATURAL OR ARTIFICIAL OPENING ENDOSCOPIC: ICD-10-PCS | Performed by: UROLOGY

## 2025-03-30 PROCEDURE — 52356 CYSTO/URETERO W/LITHOTRIPSY: CPT | Performed by: UROLOGY

## 2025-03-30 PROCEDURE — 99024 POSTOP FOLLOW-UP VISIT: CPT | Performed by: NURSE PRACTITIONER

## 2025-03-30 PROCEDURE — 82360 CALCULUS ASSAY QUANT: CPT | Performed by: UROLOGY

## 2025-03-30 PROCEDURE — 52214 CYSTOSCOPY AND TREATMENT: CPT | Performed by: UROLOGY

## 2025-03-30 PROCEDURE — 52001 CYSTO W/IRRG&EVAC MLT CLOTS: CPT | Performed by: UROLOGY

## 2025-03-30 PROCEDURE — BT1F1ZZ FLUOROSCOPY OF LEFT KIDNEY, URETER AND BLADDER USING LOW OSMOLAR CONTRAST: ICD-10-PCS | Performed by: UROLOGY

## 2025-03-30 PROCEDURE — 0T778DZ DILATION OF LEFT URETER WITH INTRALUMINAL DEVICE, VIA NATURAL OR ARTIFICIAL OPENING ENDOSCOPIC: ICD-10-PCS | Performed by: UROLOGY

## 2025-03-30 PROCEDURE — 0TC78ZZ EXTIRPATION OF MATTER FROM LEFT URETER, VIA NATURAL OR ARTIFICIAL OPENING ENDOSCOPIC: ICD-10-PCS | Performed by: UROLOGY

## 2025-03-30 PROCEDURE — 0TCB8ZZ EXTIRPATION OF MATTER FROM BLADDER, VIA NATURAL OR ARTIFICIAL OPENING ENDOSCOPIC: ICD-10-PCS | Performed by: UROLOGY

## 2025-03-30 PROCEDURE — C1758 CATHETER, URETERAL: HCPCS | Performed by: UROLOGY

## 2025-03-30 PROCEDURE — 87086 URINE CULTURE/COLONY COUNT: CPT | Performed by: UROLOGY

## 2025-03-30 DEVICE — STENT URETERAL 6FR 24CML INLAY OPTIMA: Type: IMPLANTABLE DEVICE | Site: URETER | Status: FUNCTIONAL

## 2025-03-30 RX ORDER — CEPHALEXIN 500 MG/1
500 CAPSULE ORAL EVERY 8 HOURS SCHEDULED
Status: DISCONTINUED | OUTPATIENT
Start: 2025-03-30 | End: 2025-03-30 | Stop reason: HOSPADM

## 2025-03-30 RX ORDER — MIDAZOLAM HYDROCHLORIDE 2 MG/2ML
INJECTION, SOLUTION INTRAMUSCULAR; INTRAVENOUS AS NEEDED
Status: DISCONTINUED | OUTPATIENT
Start: 2025-03-30 | End: 2025-03-30

## 2025-03-30 RX ORDER — METOCLOPRAMIDE HYDROCHLORIDE 5 MG/ML
10 INJECTION INTRAMUSCULAR; INTRAVENOUS ONCE AS NEEDED
Status: DISCONTINUED | OUTPATIENT
Start: 2025-03-30 | End: 2025-03-30

## 2025-03-30 RX ORDER — CEPHALEXIN 500 MG/1
500 CAPSULE ORAL EVERY 8 HOURS SCHEDULED
Qty: 15 CAPSULE | Refills: 0 | Status: SHIPPED | OUTPATIENT
Start: 2025-03-30 | End: 2025-04-04

## 2025-03-30 RX ORDER — LIDOCAINE HYDROCHLORIDE 10 MG/ML
INJECTION, SOLUTION EPIDURAL; INFILTRATION; INTRACAUDAL; PERINEURAL AS NEEDED
Status: DISCONTINUED | OUTPATIENT
Start: 2025-03-30 | End: 2025-03-30

## 2025-03-30 RX ORDER — MAGNESIUM HYDROXIDE 1200 MG/15ML
LIQUID ORAL AS NEEDED
Status: DISCONTINUED | OUTPATIENT
Start: 2025-03-30 | End: 2025-03-30 | Stop reason: HOSPADM

## 2025-03-30 RX ORDER — CEFAZOLIN SODIUM 1 G/3ML
INJECTION, POWDER, FOR SOLUTION INTRAMUSCULAR; INTRAVENOUS AS NEEDED
Status: DISCONTINUED | OUTPATIENT
Start: 2025-03-30 | End: 2025-03-30

## 2025-03-30 RX ORDER — DEXAMETHASONE SODIUM PHOSPHATE 10 MG/ML
INJECTION, SOLUTION INTRAMUSCULAR; INTRAVENOUS AS NEEDED
Status: DISCONTINUED | OUTPATIENT
Start: 2025-03-30 | End: 2025-03-30

## 2025-03-30 RX ORDER — EPHEDRINE SULFATE 50 MG/ML
INJECTION INTRAVENOUS AS NEEDED
Status: DISCONTINUED | OUTPATIENT
Start: 2025-03-30 | End: 2025-03-30

## 2025-03-30 RX ORDER — FENTANYL CITRATE/PF 50 MCG/ML
25 SYRINGE (ML) INJECTION
Refills: 0 | Status: DISCONTINUED | OUTPATIENT
Start: 2025-03-30 | End: 2025-03-30

## 2025-03-30 RX ORDER — ONDANSETRON 2 MG/ML
INJECTION INTRAMUSCULAR; INTRAVENOUS AS NEEDED
Status: DISCONTINUED | OUTPATIENT
Start: 2025-03-30 | End: 2025-03-30

## 2025-03-30 RX ORDER — FENTANYL CITRATE 50 UG/ML
INJECTION, SOLUTION INTRAMUSCULAR; INTRAVENOUS AS NEEDED
Status: DISCONTINUED | OUTPATIENT
Start: 2025-03-30 | End: 2025-03-30

## 2025-03-30 RX ORDER — HYDROMORPHONE HCL IN WATER/PF 6 MG/30 ML
0.2 PATIENT CONTROLLED ANALGESIA SYRINGE INTRAVENOUS
Status: DISCONTINUED | OUTPATIENT
Start: 2025-03-30 | End: 2025-03-30

## 2025-03-30 RX ORDER — PROPOFOL 10 MG/ML
INJECTION, EMULSION INTRAVENOUS AS NEEDED
Status: DISCONTINUED | OUTPATIENT
Start: 2025-03-30 | End: 2025-03-30

## 2025-03-30 RX ORDER — SODIUM CHLORIDE, SODIUM LACTATE, POTASSIUM CHLORIDE, CALCIUM CHLORIDE 600; 310; 30; 20 MG/100ML; MG/100ML; MG/100ML; MG/100ML
INJECTION, SOLUTION INTRAVENOUS CONTINUOUS PRN
Status: DISCONTINUED | OUTPATIENT
Start: 2025-03-30 | End: 2025-03-30

## 2025-03-30 RX ORDER — DIPHENHYDRAMINE HYDROCHLORIDE 50 MG/ML
12.5 INJECTION, SOLUTION INTRAMUSCULAR; INTRAVENOUS ONCE AS NEEDED
Status: DISCONTINUED | OUTPATIENT
Start: 2025-03-30 | End: 2025-03-30

## 2025-03-30 RX ORDER — ONDANSETRON 2 MG/ML
4 INJECTION INTRAMUSCULAR; INTRAVENOUS ONCE AS NEEDED
Status: DISCONTINUED | OUTPATIENT
Start: 2025-03-30 | End: 2025-03-30

## 2025-03-30 RX ADMIN — OXYBUTYNIN CHLORIDE 5 MG: 5 TABLET ORAL at 09:52

## 2025-03-30 RX ADMIN — CEPHALEXIN 500 MG: 500 CAPSULE ORAL at 14:34

## 2025-03-30 RX ADMIN — CEFAZOLIN 2000 MG: 1 INJECTION, POWDER, FOR SOLUTION INTRAMUSCULAR; INTRAVENOUS at 08:14

## 2025-03-30 RX ADMIN — OXYBUTYNIN CHLORIDE 5 MG: 5 TABLET ORAL at 16:57

## 2025-03-30 RX ADMIN — TAMSULOSIN HYDROCHLORIDE 0.4 MG: 0.4 CAPSULE ORAL at 16:57

## 2025-03-30 RX ADMIN — FENTANYL CITRATE 25 MCG: 50 INJECTION INTRAMUSCULAR; INTRAVENOUS at 08:19

## 2025-03-30 RX ADMIN — FENTANYL CITRATE 25 MCG: 50 INJECTION INTRAMUSCULAR; INTRAVENOUS at 08:11

## 2025-03-30 RX ADMIN — LIDOCAINE HYDROCHLORIDE 50 MG: 10 INJECTION, SOLUTION EPIDURAL; INFILTRATION; INTRACAUDAL at 08:07

## 2025-03-30 RX ADMIN — SODIUM CHLORIDE, SODIUM LACTATE, POTASSIUM CHLORIDE, AND CALCIUM CHLORIDE: .6; .31; .03; .02 INJECTION, SOLUTION INTRAVENOUS at 07:57

## 2025-03-30 RX ADMIN — DOCUSATE SODIUM 100 MG: 100 CAPSULE, LIQUID FILLED ORAL at 09:53

## 2025-03-30 RX ADMIN — MIDAZOLAM 2 MG: 1 INJECTION INTRAMUSCULAR; INTRAVENOUS at 08:02

## 2025-03-30 RX ADMIN — PROPOFOL 200 MG: 10 INJECTION, EMULSION INTRAVENOUS at 08:07

## 2025-03-30 RX ADMIN — EPHEDRINE SULFATE 5 MG: 50 INJECTION, SOLUTION INTRAVENOUS at 08:33

## 2025-03-30 RX ADMIN — FENTANYL CITRATE 25 MCG: 50 INJECTION INTRAMUSCULAR; INTRAVENOUS at 08:51

## 2025-03-30 RX ADMIN — CEFAZOLIN SODIUM 2000 MG: 2 SOLUTION INTRAVENOUS at 05:03

## 2025-03-30 RX ADMIN — ONDANSETRON 4 MG: 2 INJECTION, SOLUTION INTRAMUSCULAR; INTRAVENOUS at 08:15

## 2025-03-30 RX ADMIN — OXYCODONE HYDROCHLORIDE 5 MG: 5 TABLET ORAL at 09:53

## 2025-03-30 RX ADMIN — FENTANYL CITRATE 25 MCG: 50 INJECTION INTRAMUSCULAR; INTRAVENOUS at 08:34

## 2025-03-30 RX ADMIN — DEXAMETHASONE SODIUM PHOSPHATE 10 MG: 10 INJECTION, SOLUTION INTRAMUSCULAR; INTRAVENOUS at 08:14

## 2025-03-30 NOTE — ANESTHESIA POSTPROCEDURE EVALUATION
Post-Op Assessment Note    CV Status:  Stable    Pain management: adequate       Mental Status:  Alert and awake   Hydration Status:  Euvolemic   PONV Controlled:  Controlled   Airway Patency:  Patent     Post Op Vitals Reviewed: Yes    No anethesia notable event occurred.    Staff: Anesthesiologist           Last Filed PACU Vitals:  Vitals Value Taken Time   Temp 97.4 °F (36.3 °C) 03/30/25 0948   Pulse 59 03/30/25 0948   /79 03/30/25 0948   Resp 18 03/30/25 0948   SpO2 99 % 03/30/25 0948   Vitals shown include unfiled device data.    Modified Nupur:     Vitals Value Taken Time   Activity 2 03/30/25 0920   Respiration 2 03/30/25 0920   Circulation 2 03/30/25 0920   Consciousness 2 03/30/25 0920   Oxygen Saturation 2 03/30/25 0920     Modified Nupur Score: 10

## 2025-03-30 NOTE — ASSESSMENT & PLAN NOTE
-Status post cystoscopy aborted ureteroscopy retrograde pyelogram ureteral stent insertion 3/20  -Presenting to the ED 3/26 discovered to be in urinary retention status post Reyna catheter insertion for over 1 L of urine and patient returning back to the emergency room 3/27 due to gross hematuria.  -Patient admitted to urology service  -Started on CBI and manual irrigations.  -CBI clamped x 2 with persistent gross hematuria bright red unable to wean off.  -Continue CBI manual irrigations, irrigate catheter with 500 cc of normal saline was able to remove small amount of clot.  Urine continues to be bloody and intermittently clear revealing active bleeding.  -Nursing staff with as needed bladder scans which have been negative.  -CT renal cortical revealing blood products approximately 130 mL within urinary bladder with evidence of active intraluminal hemorrhage.  No discrete bladder mass identified.  Reyna catheter in place left ureteral stent with trace left hydronephrosis and mild left sided nephrogram  -Hemoglobin 14.1 on admission, repeat 12.2--11.7 -->11.8--> 11.8 today.  Hemoglobin continues to be stable.  Will continue with CBC daily.  -Patient n.p.o. today and added on for -cystoscopy clot EVAC fulguration.  I discussed procedure with patient and he is agreeable to proceeding.  -He wishes that any updates be provided to his stepmother whose information is on chart.  I called her and updated her about procedure today.   attending to obtain electronic consent.

## 2025-03-30 NOTE — ANESTHESIA PREPROCEDURE EVALUATION
Procedure:  CYSTOSCOPY EVACUATION OF CLOTS (Bladder)    Relevant Problems   No relevant active problems        Physical Exam    Airway    Mallampati score: III  TM Distance: >3 FB  Neck ROM: full     Dental   No notable dental hx     Cardiovascular      Pulmonary      Other Findings        Anesthesia Plan  ASA Score- 1     Anesthesia Type- general with ASA Monitors.         Additional Monitors:     Airway Plan:            Plan Factors-    Chart reviewed.    Patient summary reviewed.                  Induction- intravenous.    Postoperative Plan- Plan for postoperative opioid use. Planned trial extubation    Perioperative Resuscitation Plan - Level 1 - Full Code.       Informed Consent- Anesthetic plan and risks discussed with patient.  I personally reviewed this patient with the CRNA. Discussed and agreed on the Anesthesia Plan with the CRNA..      NPO Status:  Vitals Value Taken Time   Date of last liquid 03/29/25 03/30/25 0729   Time of last liquid 2200 03/30/25 0729   Date of last solid 03/29/25 03/30/25 0729   Time of last solid 1800 03/30/25 0729

## 2025-03-30 NOTE — ANESTHESIA POSTPROCEDURE EVALUATION
Post-Op Assessment Note    CV Status:  Stable  Pain Score: 2      Multimodal analgesia used between 6 hours prior to anesthesia start to PACU discharge    Mental Status:  Alert and awake   Hydration Status:  Euvolemic   PONV Controlled:  Controlled   Airway Patency:  Patent  Two or more mitigation strategies used for obstructive sleep apnea   Post Op Vitals Reviewed: Yes    No anethesia notable event occurred.    Staff: CRNA           Last Filed PACU Vitals:  Vitals Value Taken Time   Temp 97.5    Pulse 67 03/30/25 0906   /67 03/30/25 0905   Resp 7 03/30/25 0906   SpO2 97 % 03/30/25 0906   Vitals shown include unfiled device data.

## 2025-03-30 NOTE — PROGRESS NOTES
Progress Note - Urology   Name: Sreedhar Grissom 34 y.o. male I MRN: [id number]  Unit/Bed#: [facility] 811-01 I Date of Admission: 3/27/2025   Date of Service: 3/30/2025 I Hospital Day: 1     Assessment & Plan  Gross hematuria  -Status post cystoscopy aborted ureteroscopy retrograde pyelogram ureteral stent insertion 3/20  -Presenting to the ED 3/26 discovered to be in urinary retention status post Reyna catheter insertion for over 1 L of urine and patient returning back to the emergency room 3/27 due to gross hematuria.  -Patient admitted to urology service  -Started on CBI and manual irrigations.  -CBI clamped x 2 with persistent gross hematuria bright red unable to wean off.  -Continue CBI manual irrigations, irrigate catheter with 500 cc of normal saline was able to remove small amount of clot.  Urine continues to be bloody and intermittently clear revealing active bleeding.  -Nursing staff with as needed bladder scans which have been negative.  -CT renal cortical revealing blood products approximately 130 mL within urinary bladder with evidence of active intraluminal hemorrhage.  No discrete bladder mass identified.  Reyna catheter in place left ureteral stent with trace left hydronephrosis and mild left sided nephrogram  -Hemoglobin 14.1 on admission, repeat 12.2--11.7 -->11.8--> 11.8 today.  Hemoglobin continues to be stable.  Will continue with CBC daily.  -Patient n.p.o. today and added on for -cystoscopy clot EVAC fulguration.  I discussed procedure with patient and he is agreeable to proceeding.  -He wishes that any updates be provided to his stepmother whose information is on chart.  I called her and updated her about procedure today.   attending to obtain electronic consent.            Subjective   Patient sitting comfortably in bed no acute distress.  Reports he is doing more or less.  Reports the spasms and leakage around catheter intermittently.  Mainly spasms with manual irrigation as well.  But denies any  shortness of breath or abdominal pain    Objective :  Temp:  [98.1 °F (36.7 °C)-98.5 °F (36.9 °C)] 98.1 °F (36.7 °C)  HR:  [59-80] 80  BP: (131-137)/(77-81) 137/81  Resp:  [16-18] 18  SpO2:  [97 %-99 %] 97 %  O2 Device: None (Room air)    I/O         03/28 0701 03/29 0700 03/29 0701 03/30 0700 03/30 0701 03/31 0700    P.O. 240 360     I.V. (mL/kg) 2660.4 (41.3) 552.1 (8.6)     IV Piggyback 50      Total Intake(mL/kg) 2950.4 (45.8) 912.1 (14.2)     Urine (mL/kg/hr) 3345 (2.2) 6745 (4.4)     Other       Stool  0     Total Output 3345 6745     Net -394.6 -5832.9            Unmeasured Stool Occurrence  2 x           Lines/Drains/Airways       Active Status       Name Placement date Placement time Site Days    Urethral Catheter Three way 18 Fr. 03/27/25  1113  Three way  2                  Physical Exam  Constitutional:       General: He is not in acute distress.     Appearance: He is normal weight. He is not ill-appearing, toxic-appearing or diaphoretic.   HENT:      Head: Normocephalic and atraumatic.      Right Ear: External ear normal.      Left Ear: External ear normal.      Nose: Nose normal.      Mouth/Throat:      Pharynx: Oropharynx is clear.   Eyes:      General: No scleral icterus.     Conjunctiva/sclera: Conjunctivae normal.   Cardiovascular:      Rate and Rhythm: Normal rate and regular rhythm.   Pulmonary:      Effort: Pulmonary effort is normal. No respiratory distress.      Breath sounds: No wheezing, rhonchi or rales.   Abdominal:      General: Bowel sounds are normal. There is no distension.      Tenderness: There is no abdominal tenderness.   Musculoskeletal:      Cervical back: Normal range of motion.   Skin:     General: Skin is warm and dry.   Neurological:      General: No focal deficit present.      Mental Status: He is alert and oriented to person, place, and time.           Lab Results: I have reviewed the following results:  Recent Labs     03/28/25  0633 03/29/25  0517 03/30/25  0548   WBC  8.31   < > 5.57   HGB 12.2   < > 11.8*   HCT 37.7   < > 34.3*      < > 300   SODIUM 140  --   --    K 3.7  --   --      --   --    CO2 28  --   --    BUN 14  --   --    CREATININE 0.99  --   --    GLUC 95  --   --     < > = values in this interval not displayed.     CT ABDOMEN AND PELVIS WITHOUT AND WITH IV CONTRAST     INDICATION: Hematuria. As per review of electronic medical record, patient with urinary retention of 3/26/2025 requiring Reyna catheter placement who presented with gross hematuria on 3/27/2025 status post CBI.     COMPARISON: CT of the abdomen and pelvis from 3/27/2025 and 3/10/2025.     TECHNIQUE: A CT of the abdomen and pelvis was performed without and with intravenous contrast administration. Post contrast images were obtained during  nephrographic and delayed phases. Multiplanar 2D reformatted images were created from the source   data.     This examination, like all CT scans performed in the [facility], was performed utilizing techniques to minimize radiation dose exposure, including the use of iterative reconstruction and automated exposure control. Radiation dose length   product (DLP) for this visit:  1345.08 mGy-cm     IV Contrast:  85 mL of iohexol  Enteric Contrast:  Enteric contrast was not administered.     FINDINGS:     RIGHT KIDNEY AND URETER: Homogeneous right renal enhancement. No urinary tract calculi. No hydronephrosis. No perinephric collection.     No solid renal mass.  No filling defects in the collecting system or ureter.     LEFT KIDNEY AND URETER: There is a delayed left-sided nephrogram. No convincing evidence of pyelonephritis.     A left ureteral stent is seen in place with the proximal tip in an upper pole calyx and the distal tip in the urinary bladder. 5 mm calculus in the distal left ureter adjacent to the stent, at the level of the sacral promontory, approximately 1 cm caudad   compared to 3/27/2025. No intrarenal calculi. Trace left  hydronephrosis. No perinephric collection.     No solid renal mass.  No filling defects in the collecting system or ureter.     URINARY BLADDER: A Reyna catheter is seen within the urinary bladder. There are foci of air along the nondependent bladder wall, related to the Reyna catheter. Blood products are seen within the urinary bladder with approximate volume of 130 mL. There is   some IV contrast extravasation into the urinary bladder posteriorly on the urographic phase, suggesting active hemorrhage. No discrete bladder wall mass. No urinary bladder calculi.        LOWER CHEST: Linear opacities at the lung bases likely represent scarring or linear atelectasis. No pleural effusions.     LIVER/BILIARY TREE:  Normal liver morphology. No focal hepatic lesions.     No biliary ductal dilation.     GALLBLADDER: The gallbladder is somewhat distended. No calcified gallstones. No pericholecystic inflammatory change.     SPLEEN: Unremarkable. A small soft tissue density nodule is seen adjacent to the spleen, most likely representing a splenule.     PANCREAS:  Unremarkable. Normal caliber main pancreatic duct.     ADRENAL GLANDS:  Unremarkable.     STOMACH AND BOWEL:  Evaluation of the gastrointestinal tract is somewhat limited by underdistention and lack of oral contrast. No bowel obstruction or convincing inflammation.     APPENDIX:  Normal appendix.     ABDOMINOPELVIC CAVITY:  No ascites or pneumoperitoneum.     LYMPH NODES: No abdominal or pelvic lymphadenopathy.     VESSELS: Normal caliber aorta. Patent major branch vessels.     REPRODUCTIVE ORGANS:  The prostate gland is not enlarged.     ABDOMINAL WALL/INGUINAL REGIONS: No ventral abdominal wall hernia.     MUSCULOSKELETAL: No focal aggressive osseous lesions.     IMPRESSION:     1) Blood products (approximately 130 mL) within the urinary bladder with evidence of active intraluminal hemorrhage. No discrete bladder mass identified. Reyna catheter in place.     2) Left  ureteral stent in place with trace left hydronephrosis and mild delayed left-sided nephrogram. 5 mm calculus in distal left ureter adjacent to the stent, approximately 1 cm caudad compared to 3/27/2025.     3) No upper tract urothelial lesions.     4) Additional findings as above.    VTE Pharmacologic Prophylaxis: Reason for no pharmacologic prophylaxis gross hematuria  VTE Mechanical Prophylaxis: sequential compression device    Kasandra Adams PA-C

## 2025-03-30 NOTE — QUICK NOTE
Today Allyn underwent cystoscopy.  I evacuated a large volume of clot from the bladder and identified a bleeding source from the medial portion of the left UVJ bladder mucosa.  Bugbee electrocautery was used for hemostasis.  I then performed ureteroscopy.  I was able to remove his left ureteral calculus.  He has a new left ureteral stent with a string in place taped to the top of the phallus.  As long as the patient is tolerating p.o., pain is controlled, vital signs are stable, and he is able to urinate, the patient can be discharged home later today with 3 days of Keflex and 5 Norco tablets.  Patient can remove his own stent on Wednesday morning.

## 2025-03-30 NOTE — OP NOTE
OPERATIVE REPORT  PATIENT NAME: Sreedhar Grissom    :  1990  MRN: [id number]  Pt Location: [facility] CYSTO ROOM 01    SURGERY DATE: 3/30/2025    Surgeons and Role:     * Geoffrey Forte MD - Primary    Preop Diagnosis:  Hematuria [R31.9]    Post-Op Diagnosis Codes:     * Hematuria [R31.9]    Procedure(s):  Cystoscopy, clot evacuation, fulguration of bleeding source at the left UVJ, left ureteroscopy, holmium laser lithotripsy of left distal ureteral calculus, basket stone extraction, left retrograde pyelogram, exchange of left double-J ureteral stent    Specimen(s):  ID Type Source Tests Collected by Time Destination   A :  Urine Urine, Cystoscopic URINE CULTURE Geoffrey Forte MD 3/30/2025 0826    B : KIDNEY STONE Calculus Kidney, Left STONE ANALYSIS Geoffrey Forte MD 3/30/2025 0831        Estimated Blood Loss:   Minimal    Drains:  Ureteral Internal Stent Left ureter 6 Fr. (Active)   Number of days: 0       Anesthesia Type:   General    Operative Indications:  Hematuria [R31.9]      Operative Findings:  Bladder filled with clot.  Identifiable bleeding source from 2 small pinpoint arterial bleeders located on the bladder mucosal surface on the medial aspect of the left ureteral orifice.  Bugbee electrocautery used for hemostasis.  Left ureteroscopy performed.  Holmium laser lithotripsy performed of the distal left ureteral calculus.  Fragments removed with a Skylight basket and a new left 24-6 Syriac double-J ureteral stent with string was placed.      Complications:   None    Procedure and Technique:  Sreedhar is a 34-year-old male who underwent left ureteroscopy by Dr. Prater on 2025.  The distal left ureter was quite narrow in caliber and the stone could not be engaged and ultimately left ureteral stent was placed.  Patient subsequently developed significant gross hematuria requiring admission with CBI.  Despite CBI the urine continue to be quite bloody.  CT scan showed a bladder  filled with clot.  I recommended returning to the operating room for cystoscopy with clot evacuation and an attempted left ureteroscopy assuming that the left ureter has dilated.  Risk of the procedure including, not limited to bleeding, infection, perforation, sepsis, ureteral injury, and need for additional surgery if I am unable to remove the stone.  Informed consent obtained.    Patient is brought to the operating room on March 30, 2025.  After the smooth induction of general LMA anesthesia, patient was placed in the dorsolithotomy position.  His genitalia is prepped and draped in sterile fashion.  Intravenous Ancef was administered.  A timeout was performed with all members the operative team confirming the patient's identity, procedure to be performed, and laterality of the case.    A 22 Vincentian rigid cystoscope with 30 degree lens was inserted.  The bladder was thoroughly inspected and was filled with clot.  I was able to use the Ellik to remove all clot from the bladder.  At this point the stent was visualized coming from the left ureteral orifice and it became readily apparent that the source of the bleeding was from bladder mucosal bleeding at the medial portion of the left UVJ.  Bugbee electrocautery was used for hemostasis.  The 2 small pinpoint arterial bleeders were coagulated and hemostasis was excellent at this point.    Next the stent was grasped and presented to the urethral meatus.  A wire was inserted and the stent was removed.  Contrast was injected via a 5 Vincentian open-ended catheter and a filling defect in the distal left ureter was appreciated consistent with the stone.  A short semirigid ureteroscope was then inserted adjacent to the wire.  The stone was identified in the distal left ureter.  I grasped the stone with a Skylight basket but the stone was too large to be removed.  While maintaining the stone within the Skylight basket I decimated the stone with a 200 µm holmium laser fiber until  all fragments were small enough to be removed with the Skylight basket.  Multiple passes were made with the scope and there were no residual stones remaining within the ureter.  The wire was backloaded through the cystoscope and a left 24-6 Bengali double-J ureteral stent was placed in the standard fashion.  The proximal coil was appreciated within the left renal pelvis and the distal coil was visualized within the bladder.  A string was left in place.  The bladder was emptied and the cystoscope was removed.  The string was secured to the dorsum of the phallus with Tegaderm.    Overall the patient tolerated the procedure well and there were no complications.  Patient was extubated in the operating room and transferred the PACU in stable condition at the conclusion of the case.    Patient Disposition:  PACU              SIGNATURE: Geoffrey Forte MD  DATE: March 30, 2025  TIME: 9:05 AM

## 2025-03-30 NOTE — DISCHARGE INSTR - AVS FIRST PAGE
Dr. Reanna Duncan took you to the OR 3/30 and cauterized the bleeding near your bladder and also took care of the prior stone that was there.  He replaced your stent with a new stent and it is attached to a string.  Please remove your stent by gently pulling the string until you see the entire stent length out of your body on postoperative day 4.    The office will contact you for follow-up to keep track of any new stones performed.    Please report any fever, chills or inability to void.    On behalf of [facility] for urology we wish you a quick and speedy recovery.

## 2025-03-30 NOTE — TELEPHONE ENCOUNTER
Patient represented with gross hematuria was taken the operative theater by Dr. Geoffrey Forte 3/30 for cystoscopy, clot evacuation and simultaneously took care of his stone-/ureteroscopy/laser lithotripsy etc.  Please make sure patient removed his stent and postoperative day 4.  Thank you.

## 2025-03-30 NOTE — QUICK NOTE
Evening check:    Patient reevaluated this evening he is sitting comfortably in bed no acute distress.    CBI is running intermediate open and hematuria appears to have improved.  Light pink-tinged. discussed with nursing staff at times it is clear and other times is bright red.  Nursing able to manually irrigate and remove clots.    Repeat hemoglobin this evening stable at 11.8.    CT renal protocol revealed approximately 830 cc of blood products within urinary bladder with evidence of active intraluminal hemorrhage.  No discrete bladder mass identified Reyna catheter in place.  Left ureteral stent in place with trace hydronephrosis mild delayed left nephrogram.  5 mm calculus in distal left ureter adjacent to stent.     Discussed CT results with patient and discussed plan for OR in the morning for cystoscopy clot EVAC and fulguration.  Patient agreeable to plan.    See progress note in a.m. for reevaluation    Kasandra Adams PA-C

## 2025-03-31 LAB — BACTERIA UR CULT: NORMAL

## 2025-03-31 NOTE — DISCHARGE SUMMARY
Discharge Summary - Urology   Name: Sreedhar Grissom 34 y.o. male I MRN: [id number]  [facility] I Date of Admission: 3/27/2025   Date of Service: 3/31/2025 I Hospital Day: 1    Admission Date: 3/27/2025 0955  Discharge Date: 03/31/25  Admitting Diagnosis: Painful urination [R30.9]  Hematuria [R31.9]  Left ureteral stone [N20.1]  Discharge Diagnosis:   Medical Problems       Resolved Problems  Date Reviewed: 3/30/2025   None         HPI: Sreedhar Grissom is a 34-year-old male with history of ureterolithiasis who presented for definitive stone treatment 3/20/2025 and attempted ureteroscopy.  Unfortunately procedure was technically difficult and aborted.  Left ureteral stent was placed for passive dilatation and future elective re-attempt.  Patient developed gross hematuria and presented to [facility] emergency room for evaluation.  Three-way Reyna was inserted.  Urinary bleeding was persistent despite irrigation.  Patient was then admitted to our service for additional management.    Procedures Performed: No orders of the defined types were placed in this encounter.  Cystoscopy, clot evacuation, fulguration bleeding at left UVJ, left retrograde pyelography, left ureteroscopy, holmium laser lithotripsy of left ureteral calculus and left ureteral stent exchange    Summary of Hospital Course: Patient was admitted to the urology service.  He remained on CBI.  He did not require transfer care to higher acuity.  However, after 48 hours patient failed multiple attempts at clamping CBI.  He was taken to the operative theater by Dr. Geoffrey Forte 3/30/2025 for the aforementioned procedure.      Procedure went well without adverse event or complication.  Patient tolerated postoperative diet advancement and voided volitionally after Reyna catheter removal.  He remained afebrile, hemodynamically stable and adequately pain controlled.  He was deemed  stable for discharge the afternoon of 3/30.        Complications:  None    Condition at Discharge: good       Discharge instructions/Information to patient and family:   See After Visit Summary (AVS) for information provided to patient and family.      Provisions for Follow-Up Care:  See after visit summary for information related to follow-up care and any pertinent home health orders.      PCP: No primary care provider on file.    Disposition: Home    Planned Readmission: No     Discharge Medications:  See after visit summary for reconciled discharge medications provided to patient and family.      Discharge Statement:  I have spent a total time of 30 minutes in caring for this patient on the day of the visit/encounter. >30 minutes of time was spent on: Diagnostic results, Patient and family education, Counseling / Coordination of care, Documenting in the medical record, and Communicating with other healthcare professionals .

## 2025-04-01 ENCOUNTER — TELEPHONE (OUTPATIENT)
Dept: UROLOGY | Facility: AMBULATORY SURGERY CENTER | Age: 35
End: 2025-04-01

## 2025-04-01 NOTE — TELEPHONE ENCOUNTER
Post Op Note    Sreedhar Grissom is a 34 y.o. male s/p Cystoscopy, clot evacuation, fulguration of bleeding source at the left UVJ, left ureteroscopy, holmium laser lithotripsy of left distal ureteral calculus, basket stone extraction, left retrograde pyelogram, exchange of left double-J ureteral stent performed 3/30/2025.  Sreedhar Grissom is a patient of Dr. Prater and is seen at the [place] office.     How would you rate your pain on a scale from 1 to 10, 10 being the worst pain ever? 5  Have you had a fever? No  Have your bowel movements been regular? Yes  Do you have any difficulty urinating? No  Do you have any other questions or concerns that I can address at this time? None at this time.     Called and spoke with patient using Muriel to translate. He states that he is having some pain. He was not taking anything for it. Informed on use of Motrin or Tylenol. Supportive care provided. Patient is to remove stent tomorrow. He is aware how to do so and instructed to call after.

## 2025-04-04 NOTE — TELEPHONE ENCOUNTER
I was able to call and speak with patient to let him know if he removed his stent on Wednesday patient stated that he was able to and has no other complaints at this time.

## 2025-04-06 LAB
CALCIUM OXALATE DIHYDRATE MFR STONE IR: 20 %
COLOR STONE: NORMAL
COM MFR STONE: 80 %
COMMENT-STONE3: NORMAL
COMPOSITION: NORMAL
LABORATORY COMMENT REPORT: NORMAL
PHOTO: NORMAL
SIZE STONE: NORMAL MM
SPEC SOURCE SUBJ: NORMAL
STONE ANALYSIS-IMP: NORMAL
STONE ANALYSIS-IMP: NORMAL
WT STONE: 57 MG

## (undated) DEVICE — CATH FOLEY 18FR 5ML 2WAY LUBRICATH

## (undated) DEVICE — GAUZE SPONGES,16 PLY: Brand: CURITY

## (undated) DEVICE — DISPOSABLE EQUIPMENT COVER: Brand: SMALL TOWEL DRAPE

## (undated) DEVICE — CHLORHEXIDINE 4PCT 4 OZ

## (undated) DEVICE — PREMIUM DRY TRAY LF: Brand: MEDLINE INDUSTRIES, INC.

## (undated) DEVICE — SYRINGE 50ML LL

## (undated) DEVICE — GLOVE SRG BIOGEL ECLIPSE 7.5

## (undated) DEVICE — GUIDEWIRE STRGHT TIP 0.035 IN  SOLO PLUS

## (undated) DEVICE — GLOVE INDICATOR PI UNDERGLOVE SZ 6.5 BLUE

## (undated) DEVICE — 3M™ STERI-STRIP™ REINFORCED ADHESIVE SKIN CLOSURES, R1547, 1/2 IN X 4 IN (12 MM X 100 MM), 6 STRIPS/ENVELOPE: Brand: 3M™ STERI-STRIP™

## (undated) DEVICE — BAG URINE DRAINAGE 2000ML ANTI RFLX LF

## (undated) DEVICE — SUT VICRYL 3-0 SH 27 IN J416H

## (undated) DEVICE — 3M™ STERI-STRIP™ COMPOUND BENZOIN TINCTURE 40 BAGS/CARTON 4 CARTONS/CASE C1544: Brand: 3M™ STERI-STRIP™

## (undated) DEVICE — PACK TUR

## (undated) DEVICE — OCCLUSIVE GAUZE STRIP,3% BISMUTH TRIBROMOPHENATE IN PETROLATUM BLEND: Brand: XEROFORM

## (undated) DEVICE — STERILE BETHLEHEM PLASTIC HAND: Brand: CARDINAL HEALTH

## (undated) DEVICE — CATH URETERAL 5FR X 70 CM FLEX TIP POLYUR BARD

## (undated) DEVICE — BASKET SPECIMEN RETRIVAL 1.9FR 120CM

## (undated) DEVICE — SPONGE PVP SCRUB WING STERILE

## (undated) DEVICE — CYSTO TUBING SINGLE IRRIGATION

## (undated) DEVICE — FIBER STD QUANTA 200 MICRON

## (undated) DEVICE — RADIOPAQUE LINE, SAFE ENTERAL CONNECTIONS: Brand: KANGAROO

## (undated) DEVICE — SPECIMEN CONTAINER STERILE PEEL PACK

## (undated) DEVICE — PADDING CAST 4 IN  COTTON STRL

## (undated) DEVICE — MINI BLADE ROUND TIP SHARP ON ONE SIDE

## (undated) DEVICE — GLOVE SRG BIOGEL 7

## (undated) DEVICE — INTENDED FOR TISSUE SEPARATION, AND OTHER PROCEDURES THAT REQUIRE A SHARP SURGICAL BLADE TO PUNCTURE OR CUT.: Brand: BARD-PARKER SAFETY BLADES SIZE 15, STERILE

## (undated) DEVICE — CATH URET .038 10FR 50CM DUAL LUMEN

## (undated) DEVICE — CHLORAPREP HI-LITE 26ML ORANGE

## (undated) DEVICE — INTENDED FOR TISSUE SEPARATION, AND OTHER PROCEDURES THAT REQUIRE A SHARP SURGICAL BLADE TO PUNCTURE OR CUT.: Brand: BARD-PARKER ® CARBON RIB-BACK BLADES

## (undated) DEVICE — GLOVE SRG BIOGEL 6.5

## (undated) DEVICE — LIGHT HANDLE COVER SLEEVE DISP BLUE STELLAR

## (undated) DEVICE — CURITY STRETCH BANDAGE: Brand: CURITY

## (undated) DEVICE — GLOVE INDICATOR PI UNDERGLOVE SZ 8 BLUE

## (undated) DEVICE — SYRINGE 10ML LL

## (undated) DEVICE — GLOVE INDICATOR PI UNDERGLOVE SZ 7 BLUE

## (undated) DEVICE — CUFF TOURNIQUET 18 X 4 IN QUICK CONNECT DISP 1 BLADDER

## (undated) DEVICE — BASIC SINGLE BASIN 2-LF: Brand: MEDLINE INDUSTRIES, INC.